# Patient Record
Sex: FEMALE | Race: WHITE | NOT HISPANIC OR LATINO
[De-identification: names, ages, dates, MRNs, and addresses within clinical notes are randomized per-mention and may not be internally consistent; named-entity substitution may affect disease eponyms.]

---

## 2022-01-05 ENCOUNTER — APPOINTMENT (OUTPATIENT)
Dept: PULMONOLOGY | Facility: CLINIC | Age: 47
End: 2022-01-05
Payer: MEDICAID

## 2022-01-05 VITALS — HEIGHT: 64 IN | WEIGHT: 225 LBS | BODY MASS INDEX: 38.41 KG/M2

## 2022-01-05 DIAGNOSIS — Z78.9 OTHER SPECIFIED HEALTH STATUS: ICD-10-CM

## 2022-01-05 DIAGNOSIS — R06.83 SNORING: ICD-10-CM

## 2022-01-05 PROCEDURE — 99203 OFFICE O/P NEW LOW 30 MIN: CPT | Mod: 95

## 2022-01-05 NOTE — HISTORY OF PRESENT ILLNESS
[FreeTextEntry1] : Kamaljit Rosen\par Brianna Estevez\par 46 year old woman  with history of obesity is here in the sleep center to address restless sleep. Patient is being evaluated for bariatric surgery.  Patient is sleepy with [place] sleepiness score of 12.  Patient has snoring as per daughter, does not know if there are any witnessed apneas, has choking sensations.  Patient's bedtime is around 11 PM wakes up in the morning around 6.30 AM.  She feels tired when she wakes up.  Patient drinks 1 cup of coffee during the daytime. patient also has headaches. There is no history of nocturia. She is not sleepy while driving.\par STOPBANG score - 3\par

## 2022-01-05 NOTE — ASSESSMENT
[FreeTextEntry1] : 46-year-old female with history of restless sleep and daytime tiredness will rule out sleep apnea.\par \par We'll do a home study, if home study is negative then will need in lab sleep study.

## 2022-01-05 NOTE — REASON FOR VISIT
[Home] : at home, [unfilled] , at the time of the visit. [Medical Office: ([facility])___] : at the medical office located in  [Verbal consent obtained from patient] : the patient, [unfilled] [Initial Evaluation] : an initial evaluation [Sleep Apnea] : sleep apnea

## 2022-01-24 ENCOUNTER — APPOINTMENT (OUTPATIENT)
Dept: BARIATRICS/WEIGHT MGMT | Facility: CLINIC | Age: 47
End: 2022-01-24
Payer: MEDICAID

## 2022-01-24 VITALS — WEIGHT: 225 LBS | HEIGHT: 64 IN | BODY MASS INDEX: 38.41 KG/M2

## 2022-01-24 PROCEDURE — 99205 OFFICE O/P NEW HI 60 MIN: CPT | Mod: 95

## 2022-01-24 RX ORDER — SUMATRIPTAN 50 MG/1
50 TABLET, FILM COATED ORAL
Qty: 9 | Refills: 0 | Status: ACTIVE | COMMUNITY
Start: 2021-12-16

## 2022-01-24 NOTE — CONSULT LETTER
[Dear  ___] : Dear  [unfilled], [( Thank you for referring [unfilled] for consultation for _____ )] : Thank you for referring [unfilled] for consultation for [unfilled] [Please see my note below.] : Please see my note below. [Consult Closing:] : Thank you very much for allowing me to participate in the care of this patient.  If you have any questions, please do not hesitate to contact me. [Sincerely,] : Sincerely, [FreeTextEntry3] : Renetta Pappas, NP

## 2022-01-24 NOTE — HISTORY OF PRESENT ILLNESS
[Home] : at home, [unfilled] , at the time of the visit. [Other Location: e.g. Home (Enter Location, City,State)___] : at [unfilled] [Verbal consent obtained from patient] : the patient, [unfilled] [FreeTextEntry1] : 45 y/o Female referred for medical weight loss consultation by Dr. Dean\par Saw Dr. Dean for possible bariatric surgery, at this time does not qualify for the procedure\par Would like to look into options for medical treatment of obesity\par Medical Hx: Obesity, Headaches, Fatigue\par Current weight 225 lbs, lowest adult weight 220 lbs\par Food Recall: B- yogurt, BEC on eng muffin, L-lean cuisine meal, D- steak, mashed potatoes and brocolli\par Snacks on tuna with crackers or pretzels\par Water intake adequate 2-3 large bottles of water + diet snapple + 1 cup of coffee in the morning\par Stress level high- "takes it one day at a time", single parent to 2 children, works FT at [employer]\par Sleep- inadequate, had a sleep study done recently, awaiting results, snores and wakes up often throughout the night, feels exhausted throughout the day\par \par \par

## 2022-01-24 NOTE — ASSESSMENT
[FreeTextEntry1] : Dietary Modification Education provided. Recommend a diet high in vegetables intake & lean protein. Recommend eating complex carbs instead of simple carbs\par Continue Tracking dietary intake daily \par Physical Activity- recommend aerobic exercise 3-4 times per week for 30-45 mins, recommend incorporating strength training 3 times per week. Try walking or taking the stairs during your lunch break at work.\par Labs- ordered fasting labs\par Medication- pt meets criteria to initiate medication treatment of obesity. Instructed pt to reach out to insurance company to see if saxenda, wegovy or qsymia are covered. Tried contrave in the past- lost 4 lbs max-ineffective. If she does not have coverage would recommend topiramate off label, add phentermine once sleep is under control to make Qsymia OR may benefit from Metformin if she has insulin resistance or elevated glucose. \par RTO in 2-4 weeks for lab review\par \par

## 2022-02-14 ENCOUNTER — APPOINTMENT (OUTPATIENT)
Dept: BARIATRICS/WEIGHT MGMT | Facility: CLINIC | Age: 47
End: 2022-02-14
Payer: MEDICAID

## 2022-02-14 VITALS — WEIGHT: 225 LBS | HEIGHT: 64 IN | BODY MASS INDEX: 38.41 KG/M2

## 2022-02-14 DIAGNOSIS — Z00.00 ENCOUNTER FOR GENERAL ADULT MEDICAL EXAMINATION W/OUT ABNORMAL FINDINGS: ICD-10-CM

## 2022-02-14 PROCEDURE — 99215 OFFICE O/P EST HI 40 MIN: CPT | Mod: 95

## 2022-02-14 NOTE — ASSESSMENT
[FreeTextEntry1] : Recommend dietary modification. Reduce intake of bad fat including-fried foods, full fat dairy products, red meat, meat with skin on it, etc. Recommend eating foods with healthy fat including avocado, fish, omega-3 fatty acids, nuts, lean protein. Recommend avoiding simple sugars and instead eating complex carbohydrates/ whole grains. Recommend increasing your physical activity & Tracking dietary intake daily- considering Noom. \par Physical Activity- recommend aerobic exercise 3-4 times per week for 30-45 mins, recommend incorporating strength training 3 times per week. Try walking or taking the stairs during your lunch break at work.\par Labs- reviewed, recheck in 3 months\par Medication- pt meets criteria to initiate medication treatment of obesity. Tried contrave in the past- lost 4 lbs max-ineffective. \par Recommend Metformin for blood sugar regulation and appetite suppression. \par Plan to initiate treatment with Metformin 500 mg tablets. Discussed common side effects including: n/v/d/c. Discussed titration schedule with patient. Increase dose slowly to minimize GI side effects. Increase dose by 500 mg increments every 7 days.\par Scheduled f/u apt on 3/14 at 6:30pm

## 2022-02-14 NOTE — HISTORY OF PRESENT ILLNESS
[FreeTextEntry1] : 45 y/o Female referred for medical weight loss consultation by Dr. Dean\par Saw Dr. Dean for possible bariatric surgery, at this time does not qualify for the procedure\par Would like to look into options for medical treatment of obesity\par Medical Hx: Obesity, Headaches, Fatigue\par Current weight 225 lbs, lowest adult weight 220 lbs\par Tried contrave in the past- felt it was ineffective, lost 4 lbs total and discontinued before reaching full dose \par Food Recall: B- yogurt, BEC on eng muffin, L-lean cuisine meal, D- steak, mashed potatoes and broccoli\par Snacks on tuna with crackers or pretzels\par Water intake adequate 2-3 large bottles of water + diet snapple + 1 cup of coffee in the morning\par Stress level high- "takes it one day at a time", single parent to 2 children, works FT at [employer]\par Sleep- inadequate, had a sleep study done recently, awaiting results, snores and wakes up often throughout the night, feels exhausted throughout the day\par GYN- on birth control for ovarian cysts\par \par 2/14/22: Lab results reviewed with pt- elevated cholesterol, elevated LDL, elevated CRP, TSH WNL, HgBA1c- 6.1%, av glucose elevated, fasting insulin elevated, ALT elevated. Recieved sleep study results, which showed mild SHAHRAM, denies need for CPAP/treatment. Has changed dietary intake of simple carbs and changed over to complex carbs including- ww bread/rolls. Eating yogurt daily, increasing vegetable intake. Water intake good. \par \par \par

## 2022-03-14 ENCOUNTER — APPOINTMENT (OUTPATIENT)
Dept: BARIATRICS/WEIGHT MGMT | Facility: CLINIC | Age: 47
End: 2022-03-14
Payer: MEDICAID

## 2022-03-14 PROCEDURE — 99215 OFFICE O/P EST HI 40 MIN: CPT | Mod: 95

## 2022-03-14 NOTE — HISTORY OF PRESENT ILLNESS
[Home] : at home, [unfilled] , at the time of the visit. [Other Location: e.g. Home (Enter Location, City,State)___] : at [unfilled] [Verbal consent obtained from patient] : the patient, [unfilled] [FreeTextEntry1] : 45 y/o Female referred for medical weight loss consultation by Dr. Dean\par Saw Dr. Dean for possible bariatric surgery, at this time does not qualify for the procedure\par Would like to look into options for medical treatment of obesity\par Medical Hx: Obesity, Headaches, Fatigue\par Current weight 225 lbs, lowest adult weight 220 lbs\par Food Recall: B- yogurt, BEC on eng muffin, L-lean cuisine meal, D- steak, mashed potatoes and brocolli\par Snacks on tuna with crackers or pretzels\par Water intake adequate 2-3 large bottles of water + diet snapple + 1 cup of coffee in the morning\par Stress level high- "takes it one day at a time", single parent to 2 children, works FT at [employer]\par Sleep- inadequate, had a sleep study done recently, awaiting results, snores and wakes up often throughout the night, feels exhausted throughout the day\par \par 2/14/22: Lab results reviewed with pt- elevated cholesterol, elevated LDL, elevated CRP, TSH WNL, HgAb1c 6.1%, av glucose elevated, fasting insulin elevated, ALT elevated. Sleep study showed mild sleep apnea. \par \par 3/14/22: Maintained weight, tolerating metformin 2,000 mg daily. Frustrated that she isn't losing weight. Increased fruit intake. Food recall: B- yogurt with banana, L- lean cuisine, dinner- lean protein with vegetables. Joined [facility]- went once with her children so far- did 30-40 mins of cardio exercise on the indoor track and treadmill. Struggling with increased stress r/t financial stressor and hx of PTSD. Open to therapy to help with stress at this time. \par

## 2022-03-14 NOTE — ASSESSMENT
[FreeTextEntry1] : Recommend meal prep if able- given examples of ways to have healthy meals options more accessible in the fridge or freezer. \par Physical Activity- recommend aerobic exercise 3-4 times per week for 30-45 mins, recommend incorporating strength training 3 times per week. Exercise goal- use the [facility] gym at least once per month. Will increase to 2 times/ month if they have extended hours throughout the work week. \par Labs- recheck in 2 months\par Medication- pt meets criteria to initiate medication treatment of obesity. Tried contrave in the past- lost 4 lbs max-ineffective. Continue to take 2000 mg of metformin daily for insulin resistance. \par Mental Health- given information for [employer] [facility] [employer] and Marilyn De Jesus, psychotherapist\par Scheduled f/u apt on 4/18 at 6:30pm

## 2022-04-18 ENCOUNTER — APPOINTMENT (OUTPATIENT)
Dept: BARIATRICS/WEIGHT MGMT | Facility: CLINIC | Age: 47
End: 2022-04-18
Payer: MEDICAID

## 2022-04-18 VITALS — BODY MASS INDEX: 38.41 KG/M2 | WEIGHT: 225 LBS | HEIGHT: 64 IN

## 2022-04-18 PROCEDURE — 99215 OFFICE O/P EST HI 40 MIN: CPT | Mod: 95

## 2022-04-18 RX ORDER — NORETHINDRONE ACETATE AND ETHINYL ESTRADIOL AND FERROUS FUMARATE 1MG-20(21)
1-20 KIT ORAL
Qty: 84 | Refills: 0 | Status: ACTIVE | COMMUNITY
Start: 2022-03-24

## 2022-04-18 RX ORDER — DEXAMETHASONE 1 MG/1
1 TABLET ORAL
Qty: 1 | Refills: 0 | Status: DISCONTINUED | COMMUNITY
Start: 2022-01-24 | End: 2022-04-18

## 2022-04-21 RX ORDER — PHENTERMINE HYDROCHLORIDE 8 MG/1
8 TABLET ORAL DAILY
Qty: 30 | Refills: 0 | Status: DISCONTINUED | COMMUNITY
Start: 2022-04-18 | End: 2022-04-21

## 2022-04-21 RX ORDER — METFORMIN ER 500 MG 500 MG/1
500 TABLET ORAL
Qty: 120 | Refills: 1 | Status: DISCONTINUED | COMMUNITY
Start: 2022-02-15 | End: 2022-04-21

## 2022-05-16 ENCOUNTER — RX RENEWAL (OUTPATIENT)
Age: 47
End: 2022-05-16

## 2022-05-23 ENCOUNTER — APPOINTMENT (OUTPATIENT)
Dept: BARIATRICS/WEIGHT MGMT | Facility: CLINIC | Age: 47
End: 2022-05-23
Payer: MEDICAID

## 2022-05-23 VITALS — HEIGHT: 64 IN | WEIGHT: 223 LBS | BODY MASS INDEX: 38.07 KG/M2

## 2022-05-23 PROCEDURE — 99215 OFFICE O/P EST HI 40 MIN: CPT | Mod: 95

## 2022-05-23 RX ORDER — PHENTERMINE HYDROCHLORIDE 8 MG/1
8 TABLET ORAL DAILY
Qty: 30 | Refills: 0 | Status: DISCONTINUED | COMMUNITY
Start: 2022-04-21 | End: 2022-05-23

## 2022-05-23 RX ORDER — PHENTERMINE HYDROCHLORIDE 8 MG/1
8 TABLET ORAL
Qty: 30 | Refills: 0 | Status: DISCONTINUED | COMMUNITY
Start: 2022-04-28 | End: 2022-05-23

## 2022-05-23 NOTE — ASSESSMENT
[FreeTextEntry1] : Continue to focus diet on eating whole foods- lean protein, vegetables, and whole grains\par Discussed the importance of avoiding simple sugars and increasing dietary fiber in foods\par Continue to exercise 2-3 times per week \par Medication- plan to increase dose of phentermine to 15 mg daily to help with cravings and dec hunger. Continue topiramate 25 mg QHS as this helps with sleep pattern. Continue Metformin, can reduce dose to 1,500mg daily. \par Elevated WBC & CRP- instructed to see PCP to discuss further- plans on seeing Dr. Peoples at [facility] \par RTO in 1 month- 6/20 at 6:30pm

## 2022-05-23 NOTE — HISTORY OF PRESENT ILLNESS
[Home] : at home, [unfilled] , at the time of the visit. [Other Location: e.g. Home (Enter Location, City,State)___] : at [unfilled] [Verbal consent obtained from patient] : the patient, [unfilled] [FreeTextEntry1] : 45 y/o Female referred for medical weight loss consultation by Dr. Dean\par Saw Dr. Dean for possible bariatric surgery, at this time does not qualify for the procedure\par Would like to look into options for medical treatment of obesity\par Medical Hx: Obesity, Headaches, Fatigue\par Current weight 225 lbs, lowest adult weight 220 lbs\par Food Recall: B- yogurt, BEC on eng muffin, L-lean cuisine meal, D- steak, mashed potatoes and brocolli\par Snacks on tuna with crackers or pretzels\par Water intake adequate 2-3 large bottles of water + diet snapple + 1 cup of coffee in the morning\par Stress level high- "takes it one day at a time", single parent to 2 children, works FT at [employer]\par Sleep- inadequate, had a sleep study done recently, awaiting results, snores and wakes up often throughout the night, feels exhausted throughout the day\par \par 2/14/22: Lab results reviewed with pt- elevated cholesterol, elevated LDL, elevated CRP, TSH WNL, HgAb1c 6.1%, av glucose elevated, fasting insulin elevated, ALT elevated. Sleep study showed mild sleep apnea. \par \par 3/14/22: Maintained weight, tolerating metformin 2,000 mg daily. Frustrated that she isn't losing weight. Increased fruit intake. Food recall: B- yogurt with banana, L- lean cuisine, dinner- lean protein with vegetables. Joined [facility]- went once with her children so far- did 30-40 mins of cardio exercise on the indoor track and treadmill. Struggling with increased stress r/t financial stressor and hx of PTSD. Open to therapy to help with stress at this time. \par \par 4/18/22: Maintained wt, continues to take metformin 2,000 mg daily. Frustrated that she hasn't lost wt. Reviewed recent lab results, HgBA1c increased slightly to 6.2%, Elevated CRP, Elevated WBC, cholesterol levels improved to normal levels. Trying to focus on eating better and less inflammatory diet- more vegetables, fruits, and less sugar. Water intake- adequate, drinks crystal light as well. Continues to struggle with stress. Would like to try another medication for weight loss- does not have coverage for branded weight loss medication. \par \par 5/23/22: Lost 2 lbs, taking metformin 2,000 md daily, topiramate 25 mg QHS, and Lomaira 8 mg daily. Reports sleeping better but feels hot at nighttime, + loose stools and urgency after morning dose of Metformin 1000 mg. Continues to focus on eating better, cut out artificial sweeteners. Water intake improving. Reviewed recent lab results- WBC and CRP still elevated but better. Eating- salads with grilled chicken with ranch dressing, turkey sandwich on WW, etc. Exercising 2 x per week at the [facility]- walking track/ treadmill.

## 2022-06-20 ENCOUNTER — APPOINTMENT (OUTPATIENT)
Dept: BARIATRICS/WEIGHT MGMT | Facility: CLINIC | Age: 47
End: 2022-06-20
Payer: MEDICAID

## 2022-06-20 VITALS — HEIGHT: 64 IN | WEIGHT: 217 LBS | BODY MASS INDEX: 37.05 KG/M2

## 2022-06-20 PROCEDURE — 99214 OFFICE O/P EST MOD 30 MIN: CPT | Mod: 95

## 2022-06-20 NOTE — HISTORY OF PRESENT ILLNESS
[FreeTextEntry1] : 45 y/o Female referred for medical weight loss consultation by Dr. Dean\par Saw Dr. Dean for possible bariatric surgery, at this time does not qualify for the procedure\par Would like to look into options for medical treatment of obesity\par Medical Hx: Obesity, Headaches, Fatigue\par Current weight 225 lbs, lowest adult weight 220 lbs\par Food Recall: B- yogurt, BEC on eng muffin, L-lean cuisine meal, D- steak, mashed potatoes and brocolli\par Snacks on tuna with crackers or pretzels\par Water intake adequate 2-3 large bottles of water + diet snapple + 1 cup of coffee in the morning\par Stress level high- "takes it one day at a time", single parent to 2 children, works FT at [employer]\par Sleep- inadequate, had a sleep study done recently, awaiting results, snores and wakes up often throughout the night, feels exhausted throughout the day\par \par 2/14/22: Lab results reviewed with pt- elevated cholesterol, elevated LDL, elevated CRP, TSH WNL, HgAb1c 6.1%, av glucose elevated, fasting insulin elevated, ALT elevated. Sleep study showed mild sleep apnea. \par \par 3/14/22: Maintained weight, tolerating metformin 2,000 mg daily. Frustrated that she isn't losing weight. Increased fruit intake. Food recall: B- yogurt with banana, L- lean cuisine, dinner- lean protein with vegetables. Joined [facility]- went once with her children so far- did 30-40 mins of cardio exercise on the indoor track and treadmill. Struggling with increased stress r/t financial stressor and hx of PTSD. Open to therapy to help with stress at this time. \par \par 4/18/22: Maintained wt, continues to take metformin 2,000 mg daily. Frustrated that she hasn't lost wt. Reviewed recent lab results, HgBA1c increased slightly to 6.2%, Elevated CRP, Elevated WBC, cholesterol levels improved to normal levels. Trying to focus on eating better and less inflammatory diet- more vegetables, fruits, and less sugar. Water intake- adequate, drinks crystal light as well. Continues to struggle with stress. Would like to try another medication for weight loss- does not have coverage for branded weight loss medication. \par \par 5/23/22: Lost 2 lbs, taking metformin 2,000 md daily, topiramate 25 mg QHS, and Lomaira 8 mg daily. Reports sleeping better but feels hot at nighttime, + loose stools and urgency after morning dose of Metformin 1000 mg. Continues to focus on eating better, cut out artificial sweeteners. Water intake improving. Reviewed recent lab results- WBC and CRP still elevated but better. Eating- salads with grilled chicken with ranch dressing, turkey sandwich on WW, etc. Exercising 2 x per week at the [facility]- walking track/ treadmill. \par \par 6/20/22: Lost 6 lbs, 8 lbs total. Tolerating metformin 2,000 mg daily, topiramate 25 mg QHS, and Phentermine 15 mg daily. Feels motivated now that she has seen some progress with weight loss. Making better food choices and continues to go to the [facility] to exercise as able. Saw PCP for elevated WBC & CRP.

## 2022-06-20 NOTE — ASSESSMENT
[FreeTextEntry1] : Continue lifestyle modification, try to increase exercise routine to 3 times per week \par Medications- continue current doses/ regimen. \par Will recheck labs before next session to check for health progression with weight loss\par RTO in 1 month- 7/25 at 10 am

## 2022-07-25 ENCOUNTER — APPOINTMENT (OUTPATIENT)
Dept: BARIATRICS/WEIGHT MGMT | Facility: CLINIC | Age: 47
End: 2022-07-25

## 2022-07-25 VITALS — BODY MASS INDEX: 36.37 KG/M2 | HEIGHT: 64 IN | WEIGHT: 213 LBS

## 2022-07-25 PROCEDURE — 99214 OFFICE O/P EST MOD 30 MIN: CPT | Mod: 95

## 2022-07-25 NOTE — HISTORY OF PRESENT ILLNESS
[Home] : at home, [unfilled] , at the time of the visit. [Other Location: e.g. Home (Enter Location, City,State)___] : at [unfilled] [Verbal consent obtained from patient] : the patient, [unfilled] [FreeTextEntry1] : 47 y/o Female referred for medical weight loss consultation by Dr. Dean\par Saw Dr. Dean for possible bariatric surgery, at this time does not qualify for the procedure\par Would like to look into options for medical treatment of obesity\par Medical Hx: Obesity, Headaches, Fatigue\par Current weight 225 lbs, lowest adult weight 220 lbs\par Food Recall: B- yogurt, BEC on eng muffin, L-lean cuisine meal, D- steak, mashed potatoes and brocolli\par Snacks on tuna with crackers or pretzels\par Water intake adequate 2-3 large bottles of water + diet snapple + 1 cup of coffee in the morning\par Stress level high- "takes it one day at a time", single parent to 2 children, works FT at [employer]\par Sleep- inadequate, had a sleep study done recently, awaiting results, snores and wakes up often throughout the night, feels exhausted throughout the day\par \par 2/14/22: Lab results reviewed with pt- elevated cholesterol, elevated LDL, elevated CRP, TSH WNL, HgAb1c 6.1%, av glucose elevated, fasting insulin elevated, ALT elevated. Sleep study showed mild sleep apnea. \par \par 3/14/22: Maintained weight, tolerating metformin 2,000 mg daily. Frustrated that she isn't losing weight. Increased fruit intake. Food recall: B- yogurt with banana, L- lean cuisine, dinner- lean protein with vegetables. Joined [facility]- went once with her children so far- did 30-40 mins of cardio exercise on the indoor track and treadmill. Struggling with increased stress r/t financial stressor and hx of PTSD. Open to therapy to help with stress at this time. \par \par 4/18/22: Maintained wt, continues to take metformin 2,000 mg daily. Frustrated that she hasn't lost wt. Reviewed recent lab results, HgBA1c increased slightly to 6.2%, Elevated CRP, Elevated WBC, cholesterol levels improved to normal levels. Trying to focus on eating better and less inflammatory diet- more vegetables, fruits, and less sugar. Water intake- adequate, drinks crystal light as well. Continues to struggle with stress. Would like to try another medication for weight loss- does not have coverage for branded weight loss medication. \par \par 5/23/22: Lost 2 lbs, taking metformin 2,000 md daily, topiramate 25 mg QHS, and Lomaira 8 mg daily. Reports sleeping better but feels hot at nighttime, + loose stools and urgency after morning dose of Metformin 1000 mg. Continues to focus on eating better, cut out artificial sweeteners. Water intake improving. Reviewed recent lab results- WBC and CRP still elevated but better. Eating- salads with grilled chicken with ranch dressing, turkey sandwich on WW, etc. Exercising 2 x per week at the [facility]- walking track/ treadmill. \par \par 6/20/22: Lost 6 lbs, 8 lbs total. Tolerating metformin 2,000 mg daily, topiramate 25 mg QHS, and Phentermine 15 mg daily. Feels motivated now that she has seen some progress with weight loss. Making better food choices and continues to go to the [facility] to exercise as able. Saw PCP for elevated WBC & CRP. \par \par 7/25/22: Lost 4 lbs, 12 lbs total. Tolerating medication, reviewed lab result (CRP elevated but improved, HgBA1c down to 5.9%, Insulin level normal, WBC unchanged). Able to eat smaller portions and reports good appetite suppression. Sleep inadequate.

## 2022-07-25 NOTE — ASSESSMENT
[FreeTextEntry1] : Celebrated wt loss and improvement in lab levels\par Continue dietary changes and increase activity, continue to go to the gym for exercise\par Med- RX refill sent for topirimate (inc to 50 mg daily), and metfromin 2,000 mg daily\par RTO in 1- 2 months (would like to meet in-person)

## 2022-08-08 ENCOUNTER — RX RENEWAL (OUTPATIENT)
Age: 47
End: 2022-08-08

## 2022-08-11 ENCOUNTER — RX RENEWAL (OUTPATIENT)
Age: 47
End: 2022-08-11

## 2022-09-06 ENCOUNTER — RX RENEWAL (OUTPATIENT)
Age: 47
End: 2022-09-06

## 2022-09-19 ENCOUNTER — RX RENEWAL (OUTPATIENT)
Age: 47
End: 2022-09-19

## 2022-09-20 ENCOUNTER — RX RENEWAL (OUTPATIENT)
Age: 47
End: 2022-09-20

## 2022-09-22 ENCOUNTER — APPOINTMENT (OUTPATIENT)
Dept: BARIATRICS/WEIGHT MGMT | Facility: CLINIC | Age: 47
End: 2022-09-22

## 2022-09-22 VITALS — WEIGHT: 206.5 LBS | HEIGHT: 64 IN | BODY MASS INDEX: 35.26 KG/M2

## 2022-09-22 PROCEDURE — 99215 OFFICE O/P EST HI 40 MIN: CPT

## 2022-09-22 NOTE — HISTORY OF PRESENT ILLNESS
[FreeTextEntry1] : 47 y/o Female referred for medical weight loss consultation by Dr. Dean\par Saw Dr. Dean for possible bariatric surgery, at this time does not qualify for the procedure\par Would like to look into options for medical treatment of obesity\par Medical Hx: Obesity, Headaches, Fatigue\par Current weight 225 lbs, lowest adult weight 220 lbs\par Food Recall: B- yogurt, BEC on eng muffin, L-lean cuisine meal, D- steak, mashed potatoes and brocolli\par Snacks on tuna with crackers or pretzels\par Water intake adequate 2-3 large bottles of water + diet snapple + 1 cup of coffee in the morning\par Stress level high- "takes it one day at a time", single parent to 2 children, works FT at [employer]\par Sleep- inadequate, had a sleep study done recently, awaiting results, snores and wakes up often throughout the night, feels exhausted throughout the day\par \par 2/14/22: Lab results reviewed with pt- elevated cholesterol, elevated LDL, elevated CRP, TSH WNL, HgAb1c 6.1%, av glucose elevated, fasting insulin elevated, ALT elevated. Sleep study showed mild sleep apnea. \par \par 3/14/22: Maintained weight, tolerating metformin 2,000 mg daily. Frustrated that she isn't losing weight. Increased fruit intake. Food recall: B- yogurt with banana, L- lean cuisine, dinner- lean protein with vegetables. Joined [facility]- went once with her children so far- did 30-40 mins of cardio exercise on the indoor track and treadmill. Struggling with increased stress r/t financial stressor and hx of PTSD. Open to therapy to help with stress at this time. \par \par 4/18/22: Maintained wt, continues to take metformin 2,000 mg daily. Frustrated that she hasn't lost wt. Reviewed recent lab results, HgBA1c increased slightly to 6.2%, Elevated CRP, Elevated WBC, cholesterol levels improved to normal levels. Trying to focus on eating better and less inflammatory diet- more vegetables, fruits, and less sugar. Water intake- adequate, drinks crystal light as well. Continues to struggle with stress. Would like to try another medication for weight loss- does not have coverage for branded weight loss medication. \par \par 5/23/22: Lost 2 lbs, taking metformin 2,000 md daily, topiramate 25 mg QHS, and Lomaira 8 mg daily. Reports sleeping better but feels hot at nighttime, + loose stools and urgency after morning dose of Metformin 1000 mg. Continues to focus on eating better, cut out artificial sweeteners. Water intake improving. Reviewed recent lab results- WBC and CRP still elevated but better. Eating- salads with grilled chicken with ranch dressing, turkey sandwich on WW, etc. Exercising 2 x per week at the [facility]- walking track/ treadmill. \par \par 6/20/22: Lost 6 lbs, 8 lbs total. Tolerating metformin 2,000 mg daily, topiramate 25 mg QHS, and Phentermine 15 mg daily. Feels motivated now that she has seen some progress with weight loss. Making better food choices and continues to go to the [facility] to exercise as able. Saw PCP for elevated WBC & CRP. \par \par 7/25/22: Lost 4 lbs, 12 lbs total. Tolerating medication, reviewed lab result (CRP elevated but improved, HgBA1c down to 5.9%, Insulin level normal, WBC unchanged). Able to eat smaller portions and reports good appetite suppression. Sleep inadequate. \par \par 9/22/22: Lost 7 lbs, down 19 lbs total. Tolerating medication and reports decrease in appetite. Has stopped snacking and eats smaller portions. Sleep inadequate and struggling with high stress level r/t custody lundy with ex- for her daughter. Starting family therapy with her daughter next week. Eating a healthy diet overall.

## 2022-09-22 NOTE — ASSESSMENT
[FreeTextEntry1] : Celebrated her weight loss success so far and healthier lifestyle\par Encouraged to continue eating healthier \par Encouraged to exercise when able- suggested taking kids to a park and walking with a friend for emotional support\par Mental Health- discussed the importance of decreasing stress level and working on coping strategies. Rec she seek therapy for herself to help work through current struggles in addition to family therapy\par Medication- can continue to take current medication regimen daily (Metformin 2706-4012 mg daily, Phentermine 15 mg daily, & Topiramate 50 mg daily)\par RTO in 1-2 months

## 2022-10-17 ENCOUNTER — RX RENEWAL (OUTPATIENT)
Age: 47
End: 2022-10-17

## 2022-11-16 ENCOUNTER — APPOINTMENT (OUTPATIENT)
Dept: BARIATRICS/WEIGHT MGMT | Facility: CLINIC | Age: 47
End: 2022-11-16

## 2022-11-16 VITALS — BODY MASS INDEX: 34.31 KG/M2 | WEIGHT: 201 LBS | HEIGHT: 64 IN

## 2022-11-16 DIAGNOSIS — E66.9 OBESITY, UNSPECIFIED: ICD-10-CM

## 2022-11-16 PROCEDURE — 99213 OFFICE O/P EST LOW 20 MIN: CPT | Mod: 95

## 2022-11-16 NOTE — HISTORY OF PRESENT ILLNESS
[Other Location: e.g. School (Enter Location, City,State)___] : at [unfilled], at the time of the visit. [Other Location: e.g. Home (Enter Location, City,State)___] : at [unfilled] [Verbal consent obtained from patient] : the patient, [unfilled] [FreeTextEntry1] : 45 y/o Female referred for medical weight loss consultation by Dr. Dean\par Saw Dr. Dean for possible bariatric surgery, at this time does not qualify for the procedure\par Would like to look into options for medical treatment of obesity\par Medical Hx: Obesity, Headaches, Fatigue\par Current weight 225 lbs, lowest adult weight 220 lbs\par Food Recall: B- yogurt, BEC on eng muffin, L-lean cuisine meal, D- steak, mashed potatoes and brocolli\par Snacks on tuna with crackers or pretzels\par Water intake adequate 2-3 large bottles of water + diet snapple + 1 cup of coffee in the morning\par Stress level high- "takes it one day at a time", single parent to 2 children, works FT at [employer]\par Sleep- inadequate, had a sleep study done recently, awaiting results, snores and wakes up often throughout the night, feels exhausted throughout the day\par \par 2/14/22: Lab results reviewed with pt- elevated cholesterol, elevated LDL, elevated CRP, TSH WNL, HgAb1c 6.1%, av glucose elevated, fasting insulin elevated, ALT elevated. Sleep study showed mild sleep apnea. \par \par 3/14/22: Maintained weight, tolerating metformin 2,000 mg daily. Frustrated that she isn't losing weight. Increased fruit intake. Food recall: B- yogurt with banana, L- lean cuisine, dinner- lean protein with vegetables. Joined [facility]- went once with her children so far- did 30-40 mins of cardio exercise on the indoor track and treadmill. Struggling with increased stress r/t financial stressor and hx of PTSD. Open to therapy to help with stress at this time. \par \par 4/18/22: Maintained wt, continues to take metformin 2,000 mg daily. Frustrated that she hasn't lost wt. Reviewed recent lab results, HgBA1c increased slightly to 6.2%, Elevated CRP, Elevated WBC, cholesterol levels improved to normal levels. Trying to focus on eating better and less inflammatory diet- more vegetables, fruits, and less sugar. Water intake- adequate, drinks crystal light as well. Continues to struggle with stress. Would like to try another medication for weight loss- does not have coverage for branded weight loss medication. \par \par 5/23/22: Lost 2 lbs, taking metformin 2,000 md daily, topiramate 25 mg QHS, and Lomaira 8 mg daily. Reports sleeping better but feels hot at nighttime, + loose stools and urgency after morning dose of Metformin 1000 mg. Continues to focus on eating better, cut out artificial sweeteners. Water intake improving. Reviewed recent lab results- WBC and CRP still elevated but better. Eating- salads with grilled chicken with ranch dressing, turkey sandwich on WW, etc. Exercising 2 x per week at the [facility]- walking track/ treadmill. \par \par 6/20/22: Lost 6 lbs, 8 lbs total. Tolerating metformin 2,000 mg daily, topiramate 25 mg QHS, and Phentermine 15 mg daily. Feels motivated now that she has seen some progress with weight loss. Making better food choices and continues to go to the [facility] to exercise as able. Saw PCP for elevated WBC & CRP. \par \par 7/25/22: Lost 4 lbs, 12 lbs total. Tolerating medication, reviewed lab result (CRP elevated but improved, HgBA1c down to 5.9%, Insulin level normal, WBC unchanged). Able to eat smaller portions and reports good appetite suppression. Sleep inadequate. \par \par 9/22/22: Lost 7 lbs, down 19 lbs total. Tolerating medication and reports decrease in appetite. Has stopped snacking and eats smaller portions. Sleep inadequate and struggling with high stress level r/t custody lundy with ex- for her daughter. Starting family therapy with her daughter next week. Eating a healthy diet overall.\par \par 11/16/22: Lost 5 lbs, down 24 lbs total. Tolerating medication regimen and wants to continue at this point. Continues to struggle with high stress level related to custody lundy over her daughter.Sleep inadequate due to stress. Continues to go to family therapy with her daughter. Denies finding therapist for herself.

## 2022-11-16 NOTE — ASSESSMENT
[FreeTextEntry1] : Dietary- continue to focus on eating lean protein, vegetables and whole grains. \par Physical Activity- recommend aerobic exercise 3-4 times per week for 30-45 mins, recommend incorporating strength training 3 times per week. \par Medication- continue current regimen of phentermine, topiramate, and metfromin daily. Will increase topiramate dose \par Mental Health- recommend that she reach out to Employee Assistance Program for help with mental health support \par Scheduled f/u apt in 1 week

## 2022-11-18 ENCOUNTER — RX RENEWAL (OUTPATIENT)
Age: 47
End: 2022-11-18

## 2022-11-21 ENCOUNTER — APPOINTMENT (OUTPATIENT)
Dept: BARIATRICS/WEIGHT MGMT | Facility: CLINIC | Age: 47
End: 2022-11-21

## 2022-11-21 PROCEDURE — 99212 OFFICE O/P EST SF 10 MIN: CPT | Mod: 95

## 2022-11-21 NOTE — HISTORY OF PRESENT ILLNESS
[Other Location: e.g. School (Enter Location, City,State)___] : at [unfilled], at the time of the visit. [Other Location: e.g. Home (Enter Location, City,State)___] : at [unfilled] [Verbal consent obtained from patient] : the patient, [unfilled] [FreeTextEntry1] : 47 y/o Female referred for medical weight loss consultation by Dr. Dean\par Saw Dr. Dean for possible bariatric surgery, at this time does not qualify for the procedure\par Would like to look into options for medical treatment of obesity\par Medical Hx: Obesity, Headaches, Fatigue\par Current weight 225 lbs, lowest adult weight 220 lbs\par Food Recall: B- yogurt, BEC on eng muffin, L-lean cuisine meal, D- steak, mashed potatoes and brocolli\par Snacks on tuna with crackers or pretzels\par Water intake adequate 2-3 large bottles of water + diet snapple + 1 cup of coffee in the morning\par Stress level high- "takes it one day at a time", single parent to 2 children, works FT at [employer]\par Sleep- inadequate, had a sleep study done recently, awaiting results, snores and wakes up often throughout the night, feels exhausted throughout the day\par \par 2/14/22: Lab results reviewed with pt- elevated cholesterol, elevated LDL, elevated CRP, TSH WNL, HgAb1c 6.1%, av glucose elevated, fasting insulin elevated, ALT elevated. Sleep study showed mild sleep apnea. \par \par 3/14/22: Maintained weight, tolerating metformin 2,000 mg daily. Frustrated that she isn't losing weight. Increased fruit intake. Food recall: B- yogurt with banana, L- lean cuisine, dinner- lean protein with vegetables. Joined [facility]- went once with her children so far- did 30-40 mins of cardio exercise on the indoor track and treadmill. Struggling with increased stress r/t financial stressor and hx of PTSD. Open to therapy to help with stress at this time. \par \par 4/18/22: Maintained wt, continues to take metformin 2,000 mg daily. Frustrated that she hasn't lost wt. Reviewed recent lab results, HgBA1c increased slightly to 6.2%, Elevated CRP, Elevated WBC, cholesterol levels improved to normal levels. Trying to focus on eating better and less inflammatory diet- more vegetables, fruits, and less sugar. Water intake- adequate, drinks crystal light as well. Continues to struggle with stress. Would like to try another medication for weight loss- does not have coverage for branded weight loss medication. \par \par 5/23/22: Lost 2 lbs, taking metformin 2,000 md daily, topiramate 25 mg QHS, and Lomaira 8 mg daily. Reports sleeping better but feels hot at nighttime, + loose stools and urgency after morning dose of Metformin 1000 mg. Continues to focus on eating better, cut out artificial sweeteners. Water intake improving. Reviewed recent lab results- WBC and CRP still elevated but better. Eating- salads with grilled chicken with ranch dressing, turkey sandwich on WW, etc. Exercising 2 x per week at the [facility]- walking track/ treadmill. \par \par 6/20/22: Lost 6 lbs, 8 lbs total. Tolerating metformin 2,000 mg daily, topiramate 25 mg QHS, and Phentermine 15 mg daily. Feels motivated now that she has seen some progress with weight loss. Making better food choices and continues to go to the [facility] to exercise as able. Saw PCP for elevated WBC & CRP. \par \par 7/25/22: Lost 4 lbs, 12 lbs total. Tolerating medication, reviewed lab result (CRP elevated but improved, HgBA1c down to 5.9%, Insulin level normal, WBC unchanged). Able to eat smaller portions and reports good appetite suppression. Sleep inadequate. \par \par 9/22/22: Lost 7 lbs, down 19 lbs total. Tolerating medication and reports decrease in appetite. Has stopped snacking and eats smaller portions. Sleep inadequate and struggling with high stress level r/t custody lundy with ex- for her daughter. Starting family therapy with her daughter next week. Eating a healthy diet overall.\par \par 11/16/22: Lost 5 lbs, down 24 lbs total. Tolerating medication regimen and wants to continue at this point. Continues to struggle with high stress level related to custody lundy over her daughter.Sleep inadequate due to stress. Continues to go to family therapy with her daughter. Denies finding therapist for herself. \par \par 11/21/22: Able to get an appointment on 12/13/22 to start therapy through the employee assistance program at [facility]. Feels better overall today with her current situation. Reports getting better sleep since starting the higher dose of topiramate, denies side effects.

## 2022-11-21 NOTE — ASSESSMENT
[FreeTextEntry1] : Continue to focus on current lifestyle modifications \par Enc to eat healthy, increase activity level, and focus on mental health so she feels better overall\par Scheduled a f/u apt on 12/19 at 1

## 2022-12-19 ENCOUNTER — RX RENEWAL (OUTPATIENT)
Age: 47
End: 2022-12-19

## 2022-12-19 ENCOUNTER — APPOINTMENT (OUTPATIENT)
Dept: BARIATRICS/WEIGHT MGMT | Facility: CLINIC | Age: 47
End: 2022-12-19

## 2022-12-19 PROCEDURE — 99213 OFFICE O/P EST LOW 20 MIN: CPT | Mod: 95

## 2022-12-19 NOTE — HISTORY OF PRESENT ILLNESS
[Other Location: e.g. School (Enter Location, City,State)___] : at [unfilled], at the time of the visit. [Other Location: e.g. Home (Enter Location, City,State)___] : at [unfilled] [Verbal consent obtained from patient] : the patient, [unfilled] [FreeTextEntry1] : 47 y/o Female referred for medical weight loss consultation by Dr. Dean\par Saw Dr. Dean for possible bariatric surgery, at this time does not qualify for the procedure\par Would like to look into options for medical treatment of obesity\par Medical Hx: Obesity, Headaches, Fatigue\par Current weight 225 lbs, lowest adult weight 220 lbs\par Food Recall: B- yogurt, BEC on eng muffin, L-lean cuisine meal, D- steak, mashed potatoes and broccoli\par Snacks on tuna with crackers or pretzels\par Water intake adequate 2-3 large bottles of water + diet snapple + 1 cup of coffee in the morning\par Stress level high- "takes it one day at a time", single parent to 2 children, works FT at [employer]\par Sleep- inadequate, had a sleep study done recently, awaiting results, snores and wakes up often throughout the night, feels exhausted throughout the day\par \par 2/14/22: Lab results reviewed with pt- elevated cholesterol, elevated LDL, elevated CRP, TSH WNL, HgAb1c 6.1%, av glucose elevated, fasting insulin elevated, ALT elevated. Sleep study showed mild sleep apnea. \par \par 3/14/22: Maintained weight, tolerating metformin 2,000 mg daily. Frustrated that she isn't losing weight. Increased fruit intake. Food recall: B- yogurt with banana, L- lean cuisine, dinner- lean protein with vegetables. Joined [facility]- went once with her children so far- did 30-40 mins of cardio exercise on the indoor track and treadmill. Struggling with increased stress r/t financial stressor and hx of PTSD. Open to therapy to help with stress at this time. \par \par 4/18/22: Maintained wt, continues to take metformin 2,000 mg daily. Frustrated that she hasn't lost wt. Reviewed recent lab results, HgBA1c increased slightly to 6.2%, Elevated CRP, Elevated WBC, cholesterol levels improved to normal levels. Trying to focus on eating better and less inflammatory diet- more vegetables, fruits, and less sugar. Water intake- adequate, drinks crystal light as well. Continues to struggle with stress. Would like to try another medication for weight loss- does not have coverage for branded weight loss medication. \par \par 5/23/22: Lost 2 lbs, taking metformin 2,000 md daily, topiramate 25 mg QHS, and Lomaira 8 mg daily. Reports sleeping better but feels hot at nighttime, + loose stools and urgency after morning dose of Metformin 1000 mg. Continues to focus on eating better, cut out artificial sweeteners. Water intake improving. Reviewed recent lab results- WBC and CRP still elevated but better. Eating- salads with grilled chicken with ranch dressing, turkey sandwich on WW, etc. Exercising 2 x per week at the [facility]- walking track/ treadmill. \par \par 6/20/22: Lost 6 lbs, 8 lbs total. Tolerating metformin 2,000 mg daily, topiramate 25 mg QHS, and Phentermine 15 mg daily. Feels motivated now that she has seen some progress with weight loss. Making better food choices and continues to go to the [facility] to exercise as able. Saw PCP for elevated WBC & CRP. \par \par 7/25/22: Lost 4 lbs, 12 lbs total. Tolerating medication, reviewed lab result (CRP elevated but improved, HgBA1c down to 5.9%, Insulin level normal, WBC unchanged). Able to eat smaller portions and reports good appetite suppression. Sleep inadequate. \par \par 9/22/22: Lost 7 lbs, down 19 lbs total. Tolerating medication and reports decrease in appetite. Has stopped snacking and eats smaller portions. Sleep inadequate and struggling with high stress level r/t custody lundy with ex- for her daughter. Starting family therapy with her daughter next week. Eating a healthy diet overall.\par \par 11/16/22: Lost 5 lbs, down 24 lbs total. Tolerating medication regimen and wants to continue at this point. Continues to struggle with high stress level related to custody lundy over her daughter.Sleep inadequate due to stress. Continues to go to family therapy with her daughter. Denies finding therapist for herself. \par \par 11/21/22: Able to get an appointment on 12/13/22 to start therapy [facility]. Feels better overall today with her current situation. Reports getting better sleep since starting the higher dose of topiramate, denies side effects. \par \par 12/19/22: Maintained wt, plans on starting therapy next week. Continues to take medication and feels that it helps but off track with intake of vegetables and has been eating more carbs lately.

## 2022-12-19 NOTE — ASSESSMENT
[FreeTextEntry1] : Recommend she focus on weight maintenance for the next weeks with the holidays then get back on track with incorporating more vegetables \par Mental health- plans on seeing therapist next week\par Rx renewal sent for medications\par RTO in 1 month will get f/u labs at that time

## 2023-01-18 ENCOUNTER — RX RENEWAL (OUTPATIENT)
Age: 48
End: 2023-01-18

## 2023-01-25 ENCOUNTER — APPOINTMENT (OUTPATIENT)
Dept: BARIATRICS/WEIGHT MGMT | Facility: CLINIC | Age: 48
End: 2023-01-25
Payer: MEDICAID

## 2023-01-25 VITALS — HEIGHT: 64 IN | BODY MASS INDEX: 33.63 KG/M2 | WEIGHT: 197 LBS

## 2023-01-25 PROCEDURE — 99213 OFFICE O/P EST LOW 20 MIN: CPT | Mod: 95

## 2023-01-25 NOTE — ASSESSMENT
[FreeTextEntry1] : Continue healthy dietary intake- lean protein, vegetables, complex carbs\par Physical Activity- recommend incorporating exercise for mental health benefits. Plans on getting a bike in the spring so she can go bike riding with [place] the bike [place]\par Labs- recheck, lab script sent to pt via email \par Medication- continue current regimen as it is effective\par Scheduled f/u apt on 3/22 at 11

## 2023-01-25 NOTE — HISTORY OF PRESENT ILLNESS
[Other Location: e.g. School (Enter Location, City,State)___] : at [unfilled], at the time of the visit. [Other Location: e.g. Home (Enter Location, City,State)___] : at [unfilled] [Verbal consent obtained from patient] : the patient, [unfilled] [FreeTextEntry1] : 45 y/o Female referred for medical weight loss consultation by Dr. Dean\par Saw Dr. Dean for possible bariatric surgery, at this time does not qualify for the procedure\par Would like to look into options for medical treatment of obesity\par Medical Hx: Obesity, Headaches, Fatigue\par Current weight 225 lbs, lowest adult weight 220 lbs\par Food Recall: B- yogurt, BEC on eng muffin, L-lean cuisine meal, D- steak, mashed potatoes and broccoli\par Snacks on tuna with crackers or pretzels\par Water intake adequate 2-3 large bottles of water + diet snapple + 1 cup of coffee in the morning\par Stress level high- "takes it one day at a time", single parent to 2 children, works FT at [employer]\par Sleep- inadequate, had a sleep study done recently, awaiting results, snores and wakes up often throughout the night, feels exhausted throughout the day\par \par 2/14/22: Lab results reviewed with pt- elevated cholesterol, elevated LDL, elevated CRP, TSH WNL, HgAb1c 6.1%, av glucose elevated, fasting insulin elevated, ALT elevated. Sleep study showed mild sleep apnea. \par \par 3/14/22: Maintained weight, tolerating metformin 2,000 mg daily. Frustrated that she isn't losing weight. Increased fruit intake. Food recall: B- yogurt with banana, L- lean cuisine, dinner- lean protein with vegetables. Joined [employer]- went once with her children so far- did 30-40 mins of cardio exercise on the indoor track and treadmill. Struggling with increased stress r/t financial stressor and hx of PTSD. Open to therapy to help with stress at this time. \par \par 4/18/22: Maintained wt, continues to take metformin 2,000 mg daily. Frustrated that she hasn't lost wt. Reviewed recent lab results, HgBA1c increased slightly to 6.2%, Elevated CRP, Elevated WBC, cholesterol levels improved to normal levels. Trying to focus on eating better and less inflammatory diet- more vegetables, fruits, and less sugar. Water intake- adequate, drinks crystal light as well. Continues to struggle with stress. Would like to try another medication for weight loss- does not have coverage for branded weight loss medication. \par \par 5/23/22: Lost 2 lbs, taking metformin 2,000 md daily, topiramate 25 mg QHS, and Lomaira 8 mg daily. Reports sleeping better but feels hot at nighttime, + loose stools and urgency after morning dose of Metformin 1000 mg. Continues to focus on eating better, cut out artificial sweeteners. Water intake improving. Reviewed recent lab results- WBC and CRP still elevated but better. Eating- salads with grilled chicken with ranch dressing, turkey sandwich on WW, etc. Exercising 2 x per week at the [facility]- walking track/ treadmill. \par \par 6/20/22: Lost 6 lbs, 8 lbs total. Tolerating metformin 2,000 mg daily, topiramate 25 mg QHS, and Phentermine 15 mg daily. Feels motivated now that she has seen some progress with weight loss. Making better food choices and continues to go to the [facility] to exercise as able. Saw PCP for elevated WBC & CRP. \par \par 7/25/22: Lost 4 lbs, 12 lbs total. Tolerating medication, reviewed lab result (CRP elevated but improved, HgBA1c down to 5.9%, Insulin level normal, WBC unchanged). Able to eat smaller portions and reports good appetite suppression. Sleep inadequate. \par \par 9/22/22: Lost 7 lbs, down 19 lbs total. Tolerating medication and reports decrease in appetite. Has stopped snacking and eats smaller portions. Sleep inadequate and struggling with high stress level r/t custody lundy with ex- for her daughter. Starting family therapy with her daughter next week. Eating a healthy diet overall.\par \par 11/16/22: Lost 5 lbs, down 24 lbs total. Tolerating medication regimen and wants to continue at this point. Continues to struggle with high stress level related to custody lundy over her daughter.Sleep inadequate due to stress. Continues to go to family therapy with her daughter. Denies finding therapist for herself. \par \par 11/21/22: Able to get an appointment on 12/13/22 to start therapy through the employee assistance program at [facility]. Feels better overall today with her current situation. Reports getting better sleep since starting the higher dose of topiramate, denies side effects. \par \par 12/19/22: Maintained wt, plans on starting therapy next week. Continues to take medication and feels that it helps but off track with intake of vegetables and has been eating more carbs lately. \par \par 1/25/23: Lost 4 lbs, continues to tolerate medication well. Eating healthy dietary intake. Water intake adequate. Exercise limited by time- cannot fit it into her schedule, but is physically active throughout the day.

## 2023-01-25 NOTE — REASON FOR VISIT
[Follow-Up] : a follow-up visit Hydroquinone Counseling:  Patient advised that medication may result in skin irritation, lightening (hypopigmentation), dryness, and burning.  In the event of skin irritation, the patient was advised to reduce the amount of the drug applied or use it less frequently.  Rarely, spots that are treated with hydroquinone can become darker (pseudoochronosis).  Should this occur, patient instructed to stop medication and call the office. The patient verbalized understanding of the proper use and possible adverse effects of hydroquinone.  All of the patient's questions and concerns were addressed.

## 2023-02-06 ENCOUNTER — RX RENEWAL (OUTPATIENT)
Age: 48
End: 2023-02-06

## 2023-02-15 ENCOUNTER — RX RENEWAL (OUTPATIENT)
Age: 48
End: 2023-02-15

## 2023-03-02 ENCOUNTER — RX RENEWAL (OUTPATIENT)
Age: 48
End: 2023-03-02

## 2023-03-06 ENCOUNTER — RX RENEWAL (OUTPATIENT)
Age: 48
End: 2023-03-06

## 2023-03-20 ENCOUNTER — RX RENEWAL (OUTPATIENT)
Age: 48
End: 2023-03-20

## 2023-03-22 ENCOUNTER — APPOINTMENT (OUTPATIENT)
Dept: BARIATRICS/WEIGHT MGMT | Facility: CLINIC | Age: 48
End: 2023-03-22
Payer: MEDICAID

## 2023-03-22 VITALS — WEIGHT: 196.4 LBS | BODY MASS INDEX: 33.71 KG/M2

## 2023-03-22 PROCEDURE — 99214 OFFICE O/P EST MOD 30 MIN: CPT | Mod: 95

## 2023-03-22 NOTE — HISTORY OF PRESENT ILLNESS
[Other Location: e.g. School (Enter Location, City,State)___] : at [unfilled], at the time of the visit. [Other Location: e.g. Home (Enter Location, City,State)___] : at [unfilled] [Verbal consent obtained from patient] : the patient, [unfilled] [FreeTextEntry1] : 45 y/o Female referred for medical weight loss consultation by Dr. Dean\par Saw Dr. Dean for possible bariatric surgery, at this time does not qualify for the procedure\par Would like to look into options for medical treatment of obesity\par Medical Hx: Obesity, Headaches, Fatigue\par Current weight 225 lbs, lowest adult weight 220 lbs\par Food Recall: B- yogurt, BEC on eng muffin, L-lean cuisine meal, D- steak, mashed potatoes and broccoli\par Snacks on tuna with crackers or pretzels\par Water intake adequate 2-3 large bottles of water + diet snapple + 1 cup of coffee in the morning\par Stress level high- "takes it one day at a time", single parent to 2 children, works FT at [employer]\par Sleep- inadequate, had a sleep study done recently, awaiting results, snores and wakes up often throughout the night, feels exhausted throughout the day\par \par 2/14/22: Lab results reviewed with pt- elevated cholesterol, elevated LDL, elevated CRP, TSH WNL, HgAb1c 6.1%, av glucose elevated, fasting insulin elevated, ALT elevated. Sleep study showed mild sleep apnea. \par \par 3/14/22: Maintained weight, tolerating metformin 2,000 mg daily. Frustrated that she isn't losing weight. Increased fruit intake. Food recall: B- yogurt with banana, L- lean cuisine, dinner- lean protein with vegetables. Joined [employer]- went once with her children so far- did 30-40 mins of cardio exercise on the indoor track and treadmill. Struggling with increased stress r/t financial stressor and hx of PTSD. Open to therapy to help with stress at this time. \par \par 4/18/22: Maintained wt, continues to take metformin 2,000 mg daily. Frustrated that she hasn't lost wt. Reviewed recent lab results, HgBA1c increased slightly to 6.2%, Elevated CRP, Elevated WBC, cholesterol levels improved to normal levels. Trying to focus on eating better and less inflammatory diet- more vegetables, fruits, and less sugar. Water intake- adequate, drinks crystal light as well. Continues to struggle with stress. Would like to try another medication for weight loss- does not have coverage for branded weight loss medication. \par \par 5/23/22: Lost 2 lbs, taking metformin 2,000 md daily, topiramate 25 mg QHS, and Lomaira 8 mg daily. Reports sleeping better but feels hot at nighttime, + loose stools and urgency after morning dose of Metformin 1000 mg. Continues to focus on eating better, cut out artificial sweeteners. Water intake improving. Reviewed recent lab results- WBC and CRP still elevated but better. Eating- salads with grilled chicken with ranch dressing, turkey sandwich on WW, etc. Exercising 2 x per week at the [facility]- walking track/ treadmill. \par \par 6/20/22: Lost 6 lbs, 8 lbs total. Tolerating metformin 2,000 mg daily, topiramate 25 mg QHS, and Phentermine 15 mg daily. Feels motivated now that she has seen some progress with weight loss. Making better food choices and continues to go to the [facility] to exercise as able. Saw PCP for elevated WBC & CRP. \par \par 7/25/22: Lost 4 lbs, 12 lbs total. Tolerating medication, reviewed lab result (CRP elevated but improved, HgBA1c down to 5.9%, Insulin level normal, WBC unchanged). Able to eat smaller portions and reports good appetite suppression. Sleep inadequate. \par \par 9/22/22: Lost 7 lbs, down 19 lbs total. Tolerating medication and reports decrease in appetite. Has stopped snacking and eats smaller portions. Sleep inadequate and struggling with high stress level r/t custody lundy with ex- for her daughter. Starting family therapy with her daughter next week. Eating a healthy diet overall.\par \par 11/16/22: Lost 5 lbs, down 24 lbs total. Tolerating medication regimen and wants to continue at this point. Continues to struggle with high stress level related to custody lundy over her daughter.Sleep inadequate due to stress. Continues to go to family therapy with her daughter. Denies finding therapist for herself. \par \par 11/21/22: Able to get an appointment on 12/13/22 to start therapy through the employee assistance program at [facility]. Feels better overall today with her current situation. Reports getting better sleep since starting the higher dose of topiramate, denies side effects. \par \par 12/19/22: Maintained wt, plans on starting therapy next week. Continues to take medication and feels that it helps but off track with intake of vegetables and has been eating more carbs lately. \par \par 1/25/23: Lost 4 lbs, continues to tolerate medication well. Eating healthy dietary intake. Water intake adequate. Exercise limited by time- cannot fit it into her schedule, but is physically active throughout the day.\par \par 3/22/23: Maintained weight, bought a bike and getting it assembled. Dietary intake- hasn't been able to make home cooked meals due to busier schedule overall. Continues to struggle with stress level r/t daughter moving out of the house but has been doing therapy sessions on Wednesdays which she feels is helping. \par

## 2023-03-28 ENCOUNTER — RX RENEWAL (OUTPATIENT)
Age: 48
End: 2023-03-28

## 2023-04-12 ENCOUNTER — APPOINTMENT (OUTPATIENT)
Dept: BARIATRICS/WEIGHT MGMT | Facility: CLINIC | Age: 48
End: 2023-04-12
Payer: MEDICAID

## 2023-04-12 VITALS — BODY MASS INDEX: 32.95 KG/M2 | HEIGHT: 64 IN | WEIGHT: 193 LBS

## 2023-04-12 PROCEDURE — 99213 OFFICE O/P EST LOW 20 MIN: CPT | Mod: 95

## 2023-04-12 NOTE — HISTORY OF PRESENT ILLNESS
[Other Location: e.g. School (Enter Location, City,State)___] : at [unfilled], at the time of the visit. [Other Location: e.g. Home (Enter Location, City,State)___] : at [unfilled] [Verbal consent obtained from patient] : the patient, [unfilled] [FreeTextEntry1] : 45 y/o Female referred for medical weight loss consultation by Dr. Dean\par Saw Dr. Dean for possible bariatric surgery, at this time does not qualify for the procedure\par Would like to look into options for medical treatment of obesity\par Medical Hx: Obesity, Headaches, Fatigue\par Current weight 225 lbs, lowest adult weight 220 lbs\par Food Recall: B- yogurt, BEC on eng muffin, L-lean cuisine meal, D- steak, mashed potatoes and broccoli\par Snacks on tuna with crackers or pretzels\par Water intake adequate 2-3 large bottles of water + diet snapple + 1 cup of coffee in the morning\par Stress level high- "takes it one day at a time", single parent to 2 children, works FT at [employer]\par Sleep- inadequate, had a sleep study done recently, awaiting results, snores and wakes up often throughout the night, feels exhausted throughout the day\par \par 2/14/22: Lab results reviewed with pt- elevated cholesterol, elevated LDL, elevated CRP, TSH WNL, HgAb1c 6.1%, av glucose elevated, fasting insulin elevated, ALT elevated. Sleep study showed mild sleep apnea. \par \par 3/14/22: Maintained weight, tolerating metformin 2,000 mg daily. Frustrated that she isn't losing weight. Increased fruit intake. Food recall: B- yogurt with banana, L- lean cuisine, dinner- lean protein with vegetables. Joined [employer]- went once with her children so far- did 30-40 mins of cardio exercise on the indoor track and treadmill. Struggling with increased stress r/t financial stressor and hx of PTSD. Open to therapy to help with stress at this time. \par \par 4/18/22: Maintained wt, continues to take metformin 2,000 mg daily. Frustrated that she hasn't lost wt. Reviewed recent lab results, HgBA1c increased slightly to 6.2%, Elevated CRP, Elevated WBC, cholesterol levels improved to normal levels. Trying to focus on eating better and less inflammatory diet- more vegetables, fruits, and less sugar. Water intake- adequate, drinks crystal light as well. Continues to struggle with stress. Would like to try another medication for weight loss- does not have coverage for branded weight loss medication. \par \par 5/23/22: Lost 2 lbs, taking metformin 2,000 md daily, topiramate 25 mg QHS, and Lomaira 8 mg daily. Reports sleeping better but feels hot at nighttime, + loose stools and urgency after morning dose of Metformin 1000 mg. Continues to focus on eating better, cut out artificial sweeteners. Water intake improving. Reviewed recent lab results- WBC and CRP still elevated but better. Eating- salads with grilled chicken with ranch dressing, turkey sandwich on WW, etc. Exercising 2 x per week at the [facility]- walking track/ treadmill. \par \par 6/20/22: Lost 6 lbs, 8 lbs total. Tolerating metformin 2,000 mg daily, topiramate 25 mg QHS, and Phentermine 15 mg daily. Feels motivated now that she has seen some progress with weight loss. Making better food choices and continues to go to the [facility] to exercise as able. Saw PCP for elevated WBC & CRP. \par \par 7/25/22: Lost 4 lbs, 12 lbs total. Tolerating medication, reviewed lab result (CRP elevated but improved, HgBA1c down to 5.9%, Insulin level normal, WBC unchanged). Able to eat smaller portions and reports good appetite suppression. Sleep inadequate. \par \par 9/22/22: Lost 7 lbs, down 19 lbs total. Tolerating medication and reports decrease in appetite. Has stopped snacking and eats smaller portions. Sleep inadequate and struggling with high stress level r/t custody lundy with ex- for her daughter. Starting family therapy with her daughter next week. Eating a healthy diet overall.\par \par 11/16/22: Lost 5 lbs, down 24 lbs total. Tolerating medication regimen and wants to continue at this point. Continues to struggle with high stress level related to custody lundy over her daughter.Sleep inadequate due to stress. Continues to go to family therapy with her daughter. Denies finding therapist for herself. \par \par 11/21/22: Able to get an appointment on 12/13/22 to start therapy through the employee assistance program at [facility]. Feels better overall today with her current situation. Reports getting better sleep since starting the higher dose of topiramate, denies side effects. \par \par 12/19/22: Maintained wt, plans on starting therapy next week. Continues to take medication and feels that it helps but off track with intake of vegetables and has been eating more carbs lately. \par \par 1/25/23: Lost 4 lbs, continues to tolerate medication well. Eating healthy dietary intake. Water intake adequate. Exercise limited by time- cannot fit it into her schedule, but is physically active throughout the day.\par \par 3/22/23: Maintained weight, bought a bike and getting it assembled. Dietary intake- hasn't been able to make home cooked meals due to busier schedule overall. Continues to struggle with stress level r/t daughter moving out of the house but has been doing therapy sessions on Wednesdays which she feels is helping. \par \par 4/12/23: Lost 3 lbs. Eating more hearty foods throughout the winter. Plans on changing to salads over the summer months and starting to use bike for exercise. Concerned that she was hitting a platue with weight loss, but didn't’t realize that she lost 3 lbs this month. \par

## 2023-04-12 NOTE — ASSESSMENT
[FreeTextEntry1] : Celebrated her success with losing 14 % of total body weight\par Continue healthy lifestyle- inc exercise regimen and focus on eating healthy dietary intkae\par Continue on current medication regimen\par RTO in 1month - scheduled a f/u TH apt on 5/10- at 11

## 2023-04-13 ENCOUNTER — RX RENEWAL (OUTPATIENT)
Age: 48
End: 2023-04-13

## 2023-04-25 ENCOUNTER — RX RENEWAL (OUTPATIENT)
Age: 48
End: 2023-04-25

## 2023-04-26 ENCOUNTER — APPOINTMENT (OUTPATIENT)
Dept: BARIATRICS/WEIGHT MGMT | Facility: CLINIC | Age: 48
End: 2023-04-26

## 2023-04-26 ENCOUNTER — TRANSCRIPTION ENCOUNTER (OUTPATIENT)
Age: 48
End: 2023-04-26

## 2023-05-10 ENCOUNTER — APPOINTMENT (OUTPATIENT)
Dept: BARIATRICS/WEIGHT MGMT | Facility: CLINIC | Age: 48
End: 2023-05-10
Payer: MEDICAID

## 2023-05-10 PROCEDURE — 99213 OFFICE O/P EST LOW 20 MIN: CPT | Mod: 95

## 2023-05-10 RX ORDER — TOPIRAMATE 50 MG/1
50 TABLET, FILM COATED ORAL
Qty: 30 | Refills: 2 | Status: COMPLETED | COMMUNITY
Start: 2022-04-21 | End: 2023-05-10

## 2023-05-10 RX ORDER — TOPIRAMATE 25 MG/1
25 TABLET, FILM COATED ORAL
Qty: 30 | Refills: 0 | Status: COMPLETED | COMMUNITY
Start: 2022-04-18 | End: 2023-05-10

## 2023-05-10 RX ORDER — PHENTERMINE HYDROCHLORIDE 15 MG/1
15 CAPSULE ORAL
Qty: 30 | Refills: 0 | Status: COMPLETED | COMMUNITY
Start: 2022-05-23 | End: 2023-05-10

## 2023-05-10 NOTE — ASSESSMENT
[FreeTextEntry1] : Continue lifestyle modification, continue to focus on increasing exercise regimen\par Medication- continue current medication regimen.Rx renewals send \par Scheduled f/u apt on  7/5 at 11

## 2023-05-15 ENCOUNTER — RX RENEWAL (OUTPATIENT)
Age: 48
End: 2023-05-15

## 2023-05-16 ENCOUNTER — RX RENEWAL (OUTPATIENT)
Age: 48
End: 2023-05-16

## 2023-05-30 ENCOUNTER — RX RENEWAL (OUTPATIENT)
Age: 48
End: 2023-05-30

## 2023-06-12 ENCOUNTER — RX RENEWAL (OUTPATIENT)
Age: 48
End: 2023-06-12

## 2023-06-14 ENCOUNTER — NON-APPOINTMENT (OUTPATIENT)
Age: 48
End: 2023-06-14

## 2023-06-26 ENCOUNTER — RX RENEWAL (OUTPATIENT)
Age: 48
End: 2023-06-26

## 2023-07-05 ENCOUNTER — APPOINTMENT (OUTPATIENT)
Dept: BARIATRICS/WEIGHT MGMT | Facility: CLINIC | Age: 48
End: 2023-07-05
Payer: MEDICAID

## 2023-07-05 VITALS — BODY MASS INDEX: 33.47 KG/M2 | WEIGHT: 195 LBS

## 2023-07-05 DIAGNOSIS — R79.82 ELEVATED C-REACTIVE PROTEIN (CRP): ICD-10-CM

## 2023-07-05 PROCEDURE — 99214 OFFICE O/P EST MOD 30 MIN: CPT | Mod: 95

## 2023-07-05 NOTE — ASSESSMENT
[FreeTextEntry1] : Dietary- rec limiting simple carbs, discontinue eating by 7/8 pm, may be interested in IF\par Exercise- inc activity, bike riding, plan alternative option\par Labs- recheck HgBA1c, Cholesterol and CMP and CRP\par Meds- inc phentermine dose to 37.5mg daily, continue metformin and topiramate\par May try for ozempic if covered\par RTO in 3 weeks\par \par

## 2023-07-05 NOTE — HISTORY OF PRESENT ILLNESS
[Other Location: e.g. School (Enter Location, City,State)___] : at [unfilled], at the time of the visit. [Other Location: e.g. Home (Enter Location, City,State)___] : at [unfilled] [Verbal consent obtained from patient] : the patient, [unfilled] [FreeTextEntry1] : 45 y/o Female referred for medical weight loss consultation by Dr. Dean\par Saw Dr. Dean for possible bariatric surgery, at this time does not qualify for the procedure\par Would like to look into options for medical treatment of obesity\par Medical Hx: Obesity, Headaches, Fatigue\par Current weight 225 lbs, lowest adult weight 220 lbs\par Food Recall: B- yogurt, BEC on eng muffin, L-lean cuisine meal, D- steak, mashed potatoes and broccoli\par Snacks on tuna with crackers or pretzels\par Water intake adequate 2-3 large bottles of water + diet snapple + 1 cup of coffee in the morning\par Stress level high- "takes it one day at a time", single parent to 2 children, works FT at [email or web address]\par Sleep- inadequate, had a sleep study done recently, awaiting results, snores and wakes up often throughout the night, feels exhausted throughout the day\par \par 2/14/22: Lab results reviewed with pt- elevated cholesterol, elevated LDL, elevated CRP, TSH WNL, HgAb1c 6.1%, av glucose elevated, fasting insulin elevated, ALT elevated. Sleep study showed mild sleep apnea. \par \par 3/14/22: Maintained weight, tolerating metformin 2,000 mg daily. Frustrated that she isn't losing weight. Increased fruit intake. Food recall: B- yogurt with banana, L- lean cuisine, dinner- lean protein with vegetables. Joined [employer]- went once with her children so far- did 30-40 mins of cardio exercise on the indoor track and treadmill. Struggling with increased stress r/t financial stressor and hx of PTSD. Open to therapy to help with stress at this time. \par \par 4/18/22: Maintained wt, continues to take metformin 2,000 mg daily. Frustrated that she hasn't lost wt. Reviewed recent lab results, HgBA1c increased slightly to 6.2%, Elevated CRP, Elevated WBC, cholesterol levels improved to normal levels. Trying to focus on eating better and less inflammatory diet- more vegetables, fruits, and less sugar. Water intake- adequate, drinks crystal light as well. Continues to struggle with stress. Would like to try another medication for weight loss- does not have coverage for branded weight loss medication. \par \par 5/23/22: Lost 2 lbs, taking metformin 2,000 md daily, topiramate 25 mg QHS, and Lomaira 8 mg daily. Reports sleeping better but feels hot at nighttime, + loose stools and urgency after morning dose of Metformin 1000 mg. Continues to focus on eating better, cut out artificial sweeteners. Water intake improving. Reviewed recent lab results- WBC and CRP still elevated but better. Eating- salads with grilled chicken with ranch dressing, turkey sandwich on WW, etc. Exercising 2 x per week at the [facility]- walking track/ treadmill. \par \par 6/20/22: Lost 6 lbs, 8 lbs total. Tolerating metformin 2,000 mg daily, topiramate 25 mg QHS, and Phentermine 15 mg daily. Feels motivated now that she has seen some progress with weight loss. Making better food choices and continues to go to the [facility] to exercise as able. Saw PCP for elevated WBC & CRP. \par \par 7/25/22: Lost 4 lbs, 12 lbs total. Tolerating medication, reviewed lab result (CRP elevated but improved, HgBA1c down to 5.9%, Insulin level normal, WBC unchanged). Able to eat smaller portions and reports good appetite suppression. Sleep inadequate. \par \par 9/22/22: Lost 7 lbs, down 19 lbs total. Tolerating medication and reports decrease in appetite. Has stopped snacking and eats smaller portions. Sleep inadequate and struggling with high stress level r/t custody lundy with ex- for her daughter. Starting family therapy with her daughter next week. Eating a healthy diet overall.\par \par 11/16/22: Lost 5 lbs, down 24 lbs total. Tolerating medication regimen and wants to continue at this point. Continues to struggle with high stress level related to custody lundy over her daughter.Sleep inadequate due to stress. Continues to go to family therapy with her daughter. Denies finding therapist for herself. \par \par 11/21/22: Able to get an appointment on 12/13/22 to start therapy through the employee assistance program at [facility]. Feels better overall today with her current situation. Reports getting better sleep since starting the higher dose of topiramate, denies side effects. \par \par 12/19/22: Maintained wt, plans on starting therapy next week. Continues to take medication and feels that it helps but off track with intake of vegetables and has been eating more carbs lately. \par \par 1/25/23: Lost 4 lbs, continues to tolerate medication well. Eating healthy dietary intake. Water intake adequate. Exercise limited by time- cannot fit it into her schedule, but is physically active throughout the day.\par \par 3/22/23: Maintained weight, bought a bike and getting it assembled. Dietary intake- hasn't been able to make home cooked meals due to busier schedule overall. Continues to struggle with stress level r/t daughter moving out of the house but has been doing therapy sessions on Wednesdays which she feels is helping. \par \par 4/12/23: Lost 3 lbs. Eating more hearty foods throughout the winter. Plans on changing to salads over the summer months and starting to use bike for exercise. Concerned that she was hitting a platue with weight loss, but didn't’t realize that she lost 3 lbs this month. \par \par 5/10/23: Maintained weight. Eating more salad, water intake adequate. Starting seeing someone which makes her feel happier overall. Continues to tolerate medication regimen at this time. Taking metformin 2 grams daily, phentermine 30 mg daily, and topiramate 75 mg QHS. \par \par 7/5/23: Gained 3 lbs. Continues to eat well overall. Hasn't been able to bike ride due to the weather. Denies having cravings, but has been having sweets intermittently. Water intake- adequate. Sleeping well while on topiramate.

## 2023-07-11 ENCOUNTER — NON-APPOINTMENT (OUTPATIENT)
Age: 48
End: 2023-07-11

## 2023-07-17 ENCOUNTER — RX RENEWAL (OUTPATIENT)
Age: 48
End: 2023-07-17

## 2023-07-20 ENCOUNTER — RX RENEWAL (OUTPATIENT)
Age: 48
End: 2023-07-20

## 2023-07-27 ENCOUNTER — RX RENEWAL (OUTPATIENT)
Age: 48
End: 2023-07-27

## 2023-08-02 ENCOUNTER — APPOINTMENT (OUTPATIENT)
Dept: BARIATRICS/WEIGHT MGMT | Facility: CLINIC | Age: 48
End: 2023-08-02
Payer: MEDICAID

## 2023-08-02 VITALS — WEIGHT: 192 LBS | BODY MASS INDEX: 32.96 KG/M2

## 2023-08-02 PROCEDURE — 99214 OFFICE O/P EST MOD 30 MIN: CPT | Mod: 95

## 2023-08-02 NOTE — HISTORY OF PRESENT ILLNESS
[FreeTextEntry1] : 45 y/o Female referred for medical weight loss consultation by Dr. Dean Saw Dr. Dean for possible bariatric surgery, at this time does not qualify for the procedure Would like to look into options for medical treatment of obesity Medical Hx: Obesity, Headaches, Fatigue Current weight 225 lbs, lowest adult weight 220 lbs Food Recall: B- yogurt, BEC on eng muffin, L-lean cuisine meal, D- steak, mashed potatoes and broccoli Snacks on tuna with crackers or pretzels Water intake adequate 2-3 large bottles of water + diet snapple + 1 cup of coffee in the morning Stress level high- "takes it one day at a time", single parent to 2 children, works FT at [employer] Sleep- inadequate, had a sleep study done recently, awaiting results, snores and wakes up often throughout the night, feels exhausted throughout the day  2/14/22: Lab results reviewed with pt- elevated cholesterol, elevated LDL, elevated CRP, TSH WNL, HgAb1c 6.1%, av glucose elevated, fasting insulin elevated, ALT elevated. Sleep study showed mild sleep apnea.   3/14/22: Maintained weight, tolerating metformin 2,000 mg daily. Frustrated that she isn't losing weight. Increased fruit intake. Food recall: B- yogurt with banana, L- lean cuisine, dinner- lean protein with vegetables. Joined [employer]- went once with her children so far- did 30-40 mins of cardio exercise on the indoor track and treadmill. Struggling with increased stress r/t financial stressor and hx of PTSD. Open to therapy to help with stress at this time.   4/18/22: Maintained wt, continues to take metformin 2,000 mg daily. Frustrated that she hasn't lost wt. Reviewed recent lab results, HgBA1c increased slightly to 6.2%, Elevated CRP, Elevated WBC, cholesterol levels improved to normal levels. Trying to focus on eating better and less inflammatory diet- more vegetables, fruits, and less sugar. Water intake- adequate, drinks crystal light as well. Continues to struggle with stress. Would like to try another medication for weight loss- does not have coverage for branded weight loss medication.   5/23/22: Lost 2 lbs, taking metformin 2,000 md daily, topiramate 25 mg QHS, and Lomaira 8 mg daily. Reports sleeping better but feels hot at nighttime, + loose stools and urgency after morning dose of Metformin 1000 mg. Continues to focus on eating better, cut out artificial sweeteners. Water intake improving. Reviewed recent lab results- WBC and CRP still elevated but better. Eating- salads with grilled chicken with ranch dressing, turkey sandwich on WW, etc. Exercising 2 x per week at the [facility]- walking track/ treadmill.   6/20/22: Lost 6 lbs, 8 lbs total. Tolerating metformin 2,000 mg daily, topiramate 25 mg QHS, and Phentermine 15 mg daily. Feels motivated now that she has seen some progress with weight loss. Making better food choices and continues to go to the [facility] to exercise as able. Saw PCP for elevated WBC & CRP.   7/25/22: Lost 4 lbs, 12 lbs total. Tolerating medication, reviewed lab result (CRP elevated but improved, HgBA1c down to 5.9%, Insulin level normal, WBC unchanged). Able to eat smaller portions and reports good appetite suppression. Sleep inadequate.   9/22/22: Lost 7 lbs, down 19 lbs total. Tolerating medication and reports decrease in appetite. Has stopped snacking and eats smaller portions. Sleep inadequate and struggling with high stress level r/t custody lundy with ex- for her daughter. Starting family therapy with her daughter next week. Eating a healthy diet overall.  11/16/22: Lost 5 lbs, down 24 lbs total. Tolerating medication regimen and wants to continue at this point. Continues to struggle with high stress level related to custody lundy over her daughter.Sleep inadequate due to stress. Continues to go to family therapy with her daughter. Denies finding therapist for herself.   11/21/22: Able to get an appointment on 12/13/22 to start therapy through the employee assistance program at [facility]. Feels better overall today with her current situation. Reports getting better sleep since starting the higher dose of topiramate, denies side effects.   12/19/22: Maintained wt, plans on starting therapy next week. Continues to take medication and feels that it helps but off track with intake of vegetables and has been eating more carbs lately.   1/25/23: Lost 4 lbs, continues to tolerate medication well. Eating healthy dietary intake. Water intake adequate. Exercise limited by time- cannot fit it into her schedule, but is physically active throughout the day.  3/22/23: Maintained weight, bought a bike and getting it assembled. Dietary intake- hasn't been able to make home cooked meals due to busier schedule overall. Continues to struggle with stress level r/t daughter moving out of the house but has been doing therapy sessions on Wednesdays which she feels is helping.   4/12/23: Lost 3 lbs. Eating more hearty foods throughout the winter. Plans on changing to salads over the summer months and starting to use bike for exercise. Concerned that she was hitting a platue with weight loss, but didn't't realize that she lost 3 lbs this month.   5/10/23: Maintained weight. Eating more salad, water intake adequate. Starting seeing someone which makes her feel happier overall. Continues to tolerate medication regimen at this time. Taking metformin 2 grams daily, phentermine 30 mg daily, and topiramate 75 mg QHS.   7/5/23: Gained 3 lbs. Continues to eat well overall. Hasn't been able to bike ride due to the weather. Denies having cravings, but has been having sweets intermittently. Water intake- adequate. Sleeping well while on topiramate.   8/2/23: Lost 3 lbs, skipped breakfast at times because she wasn't hungry. Recently increased dose of medication. Exercise is limited currently. Starting a new job in [employer] in September- hybrid remote. Feels that she will have more time for walking/ exercise.

## 2023-08-02 NOTE — REASON FOR VISIT
[Other Location: e.g. School (Enter Location, City,State)___] : at [unfilled], at the time of the visit. [Other Location: e.g. Home (Enter Location, City,State)___] : at [unfilled] [Follow-Up] : a follow-up visit

## 2023-08-02 NOTE — ASSESSMENT
[FreeTextEntry1] : Continues to eat healthier. Rec- start incorporating exercise as able with busy schedule Labs- plans on having these redone at PCP apt for annual physical Med- continue Metformin, Phentermine, & Topiramate  RTO in 1month

## 2023-08-21 ENCOUNTER — RX RENEWAL (OUTPATIENT)
Age: 48
End: 2023-08-21

## 2023-09-12 ENCOUNTER — RX RENEWAL (OUTPATIENT)
Age: 48
End: 2023-09-12

## 2023-09-13 ENCOUNTER — APPOINTMENT (OUTPATIENT)
Dept: BARIATRICS/WEIGHT MGMT | Facility: CLINIC | Age: 48
End: 2023-09-13
Payer: MEDICAID

## 2023-09-13 PROCEDURE — 99214 OFFICE O/P EST MOD 30 MIN: CPT | Mod: 95

## 2023-09-18 ENCOUNTER — RX RENEWAL (OUTPATIENT)
Age: 48
End: 2023-09-18

## 2023-10-09 ENCOUNTER — RX RENEWAL (OUTPATIENT)
Age: 48
End: 2023-10-09

## 2023-10-17 ENCOUNTER — APPOINTMENT (OUTPATIENT)
Dept: BARIATRICS/WEIGHT MGMT | Facility: CLINIC | Age: 48
End: 2023-10-17

## 2023-10-30 ENCOUNTER — APPOINTMENT (OUTPATIENT)
Dept: BARIATRICS/WEIGHT MGMT | Facility: CLINIC | Age: 48
End: 2023-10-30
Payer: MEDICAID

## 2023-10-30 VITALS — BODY MASS INDEX: 32.78 KG/M2 | WEIGHT: 192 LBS | HEIGHT: 64 IN

## 2023-10-30 PROCEDURE — 99213 OFFICE O/P EST LOW 20 MIN: CPT | Mod: 95

## 2023-10-30 RX ORDER — METFORMIN ER 500 MG 500 MG/1
500 TABLET ORAL
Qty: 120 | Refills: 0 | Status: COMPLETED | COMMUNITY
Start: 2022-02-14 | End: 2023-10-30

## 2023-10-30 RX ORDER — SEMAGLUTIDE 0.68 MG/ML
2 INJECTION, SOLUTION SUBCUTANEOUS
Qty: 1 | Refills: 0 | Status: COMPLETED | COMMUNITY
Start: 2023-07-05 | End: 2023-10-30

## 2023-11-13 ENCOUNTER — RX RENEWAL (OUTPATIENT)
Age: 48
End: 2023-11-13

## 2023-12-13 ENCOUNTER — APPOINTMENT (OUTPATIENT)
Dept: BARIATRICS/WEIGHT MGMT | Facility: CLINIC | Age: 48
End: 2023-12-13
Payer: MEDICAID

## 2023-12-13 VITALS — HEIGHT: 64 IN | WEIGHT: 195 LBS | BODY MASS INDEX: 33.29 KG/M2

## 2023-12-13 PROCEDURE — 99215 OFFICE O/P EST HI 40 MIN: CPT | Mod: 95

## 2023-12-13 NOTE — ASSESSMENT
[FreeTextEntry1] : Continue to focus on healthy lifestyle modification & celebrated her success with our program [facility] - called Dr. Chandra to schedule a f/u apt- pt has an apt this Saturday to figure out if she plans on continuing treatment with him or changing to a different provider bc mental health treatment is a priority at this time. Encouraged to continue therapy sessions. Rec deep breathing and watching headspace to help dec stress level  Rec exercising to help with stress as well  Med- Continue current treatment plan RTO in 1 month for f/u
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
## 2024-01-18 ENCOUNTER — RX RENEWAL (OUTPATIENT)
Age: 49
End: 2024-01-18

## 2024-01-22 ENCOUNTER — APPOINTMENT (OUTPATIENT)
Dept: BARIATRICS/WEIGHT MGMT | Facility: CLINIC | Age: 49
End: 2024-01-22
Payer: MEDICAID

## 2024-01-22 VITALS — BODY MASS INDEX: 33.29 KG/M2 | HEIGHT: 64 IN | WEIGHT: 195 LBS

## 2024-01-22 PROCEDURE — 99214 OFFICE O/P EST MOD 30 MIN: CPT | Mod: 95

## 2024-01-22 RX ORDER — TOPIRAMATE 25 MG/1
25 TABLET, FILM COATED ORAL
Qty: 90 | Refills: 0 | Status: COMPLETED | COMMUNITY
Start: 2022-11-16 | End: 2024-01-22

## 2024-01-22 RX ORDER — PHENTERMINE HYDROCHLORIDE 15 MG/1
15 CAPSULE ORAL
Qty: 60 | Refills: 1 | Status: COMPLETED | COMMUNITY
Start: 2023-10-30 | End: 2024-01-22

## 2024-01-22 RX ORDER — PHENTERMINE HYDROCHLORIDE 37.5 MG/1
37.5 CAPSULE ORAL
Qty: 30 | Refills: 1 | Status: COMPLETED | COMMUNITY
Start: 2023-07-05 | End: 2024-01-22
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
## 2024-01-22 NOTE — ASSESSMENT
[FreeTextEntry1] : Continue to focus on healthy lifestyle with increasing activity level and making better food choices Continue current medication treatment, will keep dose of phentermine at 30 mg and topiramate 100 mg QHS. Discussed possible medication interactions with her new medicine zoloft and symptoms of serotonin syndrome to watch for.  Continue to see psychiatrist Didn't feel that the therapist was helpful overall, may benefit from finding a new therapist  RTO in 1 month for f/u

## 2024-02-28 ENCOUNTER — NON-APPOINTMENT (OUTPATIENT)
Age: 49
End: 2024-02-28

## 2024-03-12 ENCOUNTER — APPOINTMENT (OUTPATIENT)
Dept: BARIATRICS/WEIGHT MGMT | Facility: CLINIC | Age: 49
End: 2024-03-12
Payer: MEDICAID

## 2024-03-12 VITALS
SYSTOLIC BLOOD PRESSURE: 115 MMHG | HEART RATE: 96 BPM | RESPIRATION RATE: 16 BRPM | WEIGHT: 199 LBS | HEIGHT: 64 IN | DIASTOLIC BLOOD PRESSURE: 75 MMHG | BODY MASS INDEX: 33.97 KG/M2 | OXYGEN SATURATION: 98 %

## 2024-03-12 DIAGNOSIS — R73.09 OTHER ABNORMAL GLUCOSE: ICD-10-CM

## 2024-03-12 DIAGNOSIS — F32.A DEPRESSION, UNSPECIFIED: ICD-10-CM

## 2024-03-12 PROCEDURE — 99214 OFFICE O/P EST MOD 30 MIN: CPT

## 2024-03-12 NOTE — ASSESSMENT
[FreeTextEntry1] : Dietary- given sheet with healthier meal options. Rec- premier protein shake as meal replacement or greek yogurt with berries or egg bites. Rec having a rotisserri chicken in fridge and steam able vegetables for healthier meal option Exercise- rec incorporating more activity or structured exercise Med- continue with current treatment plan- making branded qsymia with generic options since current insurance does not cover medical weight management medicines and pt has tolerated well.  RTO in 1 month
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
## 2024-04-22 ENCOUNTER — APPOINTMENT (OUTPATIENT)
Dept: BARIATRICS/WEIGHT MGMT | Facility: CLINIC | Age: 49
End: 2024-04-22
Payer: MEDICAID

## 2024-04-22 PROCEDURE — 99213 OFFICE O/P EST LOW 20 MIN: CPT
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
## 2024-04-22 NOTE — ASSESSMENT
[FreeTextEntry1] : Rec meal planning- will make meatloaf, gets frozen vegetables. Rec turkey or chicken meatballs, [place] chicken, likes salmon. Likes summer salad  Exercise- plans on walking outdoors with friends to inc exercise Meds- continue with phentermine and topiramate  RTO in 1 month for f/u

## 2024-05-20 ENCOUNTER — APPOINTMENT (OUTPATIENT)
Dept: BARIATRICS/WEIGHT MGMT | Facility: CLINIC | Age: 49
End: 2024-05-20
Payer: MEDICAID

## 2024-05-20 VITALS — HEIGHT: 64 IN | BODY MASS INDEX: 34.31 KG/M2 | WEIGHT: 201 LBS

## 2024-05-20 DIAGNOSIS — E66.9 OBESITY, UNSPECIFIED: ICD-10-CM

## 2024-05-20 DIAGNOSIS — R51.9 HEADACHE, UNSPECIFIED: ICD-10-CM

## 2024-05-20 PROCEDURE — G2211 COMPLEX E/M VISIT ADD ON: CPT | Mod: NC,1L

## 2024-05-20 PROCEDURE — 99213 OFFICE O/P EST LOW 20 MIN: CPT

## 2024-05-20 RX ORDER — PHENTERMINE HYDROCHLORIDE 37.5 MG/1
37.5 CAPSULE ORAL
Qty: 30 | Refills: 1 | Status: ACTIVE | COMMUNITY
Start: 2023-03-22 | End: 1900-01-01

## 2024-05-20 RX ORDER — TOPIRAMATE 25 MG/1
25 CAPSULE, EXTENDED RELEASE ORAL
Qty: 60 | Refills: 1 | Status: ACTIVE | COMMUNITY
Start: 2024-05-20 | End: 1900-01-01

## 2024-05-20 NOTE — ASSESSMENT
[FreeTextEntry1] : Increase medication dose of phentermine and topiramate to help with weight gain Meal prepping and planning  for healthier dietary intake  Plans on exercising on breaks throughout the week, walk or bike ride RTO in 1 month for f.u
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
## 2024-06-19 RX ORDER — TOPIRAMATE 100 MG/1
100 TABLET, FILM COATED ORAL
Qty: 30 | Refills: 1 | Status: ACTIVE | COMMUNITY
Start: 2023-12-13 | End: 1900-01-01

## 2024-07-03 ENCOUNTER — APPOINTMENT (OUTPATIENT)
Dept: BARIATRICS/WEIGHT MGMT | Facility: CLINIC | Age: 49
End: 2024-07-03
Payer: COMMERCIAL

## 2024-07-03 DIAGNOSIS — F32.A DEPRESSION, UNSPECIFIED: ICD-10-CM

## 2024-07-03 DIAGNOSIS — R73.09 OTHER ABNORMAL GLUCOSE: ICD-10-CM

## 2024-07-03 DIAGNOSIS — E66.9 OBESITY, UNSPECIFIED: ICD-10-CM

## 2024-07-03 PROCEDURE — G2211 COMPLEX E/M VISIT ADD ON: CPT

## 2024-07-03 PROCEDURE — 99214 OFFICE O/P EST MOD 30 MIN: CPT

## 2024-07-03 RX ORDER — PHENTERMINE HYDROCHLORIDE 37.5 MG/1
37.5 CAPSULE ORAL
Qty: 30 | Refills: 1 | Status: ACTIVE | COMMUNITY
Start: 2024-07-03 | End: 1900-01-01

## 2024-07-31 ENCOUNTER — APPOINTMENT (OUTPATIENT)
Dept: BARIATRICS/WEIGHT MGMT | Facility: CLINIC | Age: 49
End: 2024-07-31
Payer: COMMERCIAL

## 2024-07-31 VITALS — BODY MASS INDEX: 34.33 KG/M2 | WEIGHT: 200 LBS

## 2024-07-31 DIAGNOSIS — R51.9 HEADACHE, UNSPECIFIED: ICD-10-CM

## 2024-07-31 DIAGNOSIS — R73.09 OTHER ABNORMAL GLUCOSE: ICD-10-CM

## 2024-07-31 DIAGNOSIS — E66.9 OBESITY, UNSPECIFIED: ICD-10-CM

## 2024-07-31 PROCEDURE — 99213 OFFICE O/P EST LOW 20 MIN: CPT

## 2024-07-31 PROCEDURE — G2211 COMPLEX E/M VISIT ADD ON: CPT

## 2024-07-31 NOTE — ASSESSMENT
[FreeTextEntry1] : Eating better lately since she is working on decreasing stress  Continue to utilize headspace, given additional resources for telehealth therapy services to call and seek therapy session, open to starting this in September when routine is more consistent with work schedule Continue to focus on increasing activity even if it is functional exercise Continue medicine treatment RTO in 1 month
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
## 2024-08-01 RX ORDER — TOPIRAMATE 25 MG/1
25 TABLET, FILM COATED ORAL
Qty: 90 | Refills: 2 | Status: ACTIVE | COMMUNITY
Start: 2024-08-01 | End: 1900-01-01

## 2024-08-20 ENCOUNTER — TRANSCRIPTION ENCOUNTER (OUTPATIENT)
Age: 49
End: 2024-08-20

## 2024-08-28 ENCOUNTER — APPOINTMENT (OUTPATIENT)
Dept: BARIATRICS/WEIGHT MGMT | Facility: CLINIC | Age: 49
End: 2024-08-28
Payer: COMMERCIAL

## 2024-08-28 VITALS — BODY MASS INDEX: 34.15 KG/M2 | WEIGHT: 200 LBS | HEIGHT: 64 IN

## 2024-08-28 DIAGNOSIS — F32.A DEPRESSION, UNSPECIFIED: ICD-10-CM

## 2024-08-28 DIAGNOSIS — R51.9 HEADACHE, UNSPECIFIED: ICD-10-CM

## 2024-08-28 DIAGNOSIS — E66.9 OBESITY, UNSPECIFIED: ICD-10-CM

## 2024-08-28 PROCEDURE — G2211 COMPLEX E/M VISIT ADD ON: CPT

## 2024-08-28 PROCEDURE — 99213 OFFICE O/P EST LOW 20 MIN: CPT

## 2024-08-28 RX ORDER — SERTRALINE HYDROCHLORIDE 100 MG/1
100 TABLET, FILM COATED ORAL
Refills: 0 | Status: ACTIVE | COMMUNITY

## 2024-08-28 RX ORDER — TRAZODONE HYDROCHLORIDE 50 MG/1
50 TABLET ORAL
Refills: 0 | Status: ACTIVE | COMMUNITY
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
## 2024-08-28 NOTE — ASSESSMENT
[FreeTextEntry1] : - Summary : The plan is to potentially discontinue phentermine, and consider adding Wellbutrin for weight loss and mood support after consulting Carol's prescribing provider. Incorporating a therapist is also recommended. - Plan : - Continue current medication regimen, consult psychiatric provider for addition of wellbutrin and discontinuation of phentermine -Continue healthy lifestyle and increasing exercise by walking with friends - Recommend starting therapy for additional support

## 2024-09-30 ENCOUNTER — APPOINTMENT (OUTPATIENT)
Dept: BARIATRICS/WEIGHT MGMT | Facility: CLINIC | Age: 49
End: 2024-09-30
Payer: COMMERCIAL

## 2024-09-30 VITALS — WEIGHT: 200 LBS | HEIGHT: 64 IN | BODY MASS INDEX: 34.15 KG/M2

## 2024-09-30 DIAGNOSIS — R73.09 OTHER ABNORMAL GLUCOSE: ICD-10-CM

## 2024-09-30 DIAGNOSIS — R51.9 HEADACHE, UNSPECIFIED: ICD-10-CM

## 2024-09-30 DIAGNOSIS — E66.9 OBESITY, UNSPECIFIED: ICD-10-CM

## 2024-09-30 PROCEDURE — G2211 COMPLEX E/M VISIT ADD ON: CPT

## 2024-09-30 PROCEDURE — 99214 OFFICE O/P EST MOD 30 MIN: CPT
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
## 2024-09-30 NOTE — ASSESSMENT
[FreeTextEntry1] : Discontinue phentermine per pt request Can inc topiramate to 175 mg as prescribed If she wants to resume phentermine, need to check BP  Enc to continue with current medication regimen and really focus on lifestyle modification- increasing exercise and limiting simple carbs Continue to focus on sleep schedule RTO in 1 month, scheduled an in- person apt

## 2024-10-11 NOTE — HISTORY OF PRESENT ILLNESS
[Other Location: e.g. School (Enter Location, City,State)___] : at [unfilled], at the time of the visit. [Other Location: e.g. Home (Enter Location, City,State)___] : at [unfilled] yes [Verbal consent obtained from patient] : the patient, [unfilled] [FreeTextEntry1] : 45 y/o Female referred for medical weight loss consultation by Dr. Dean\par Saw Dr. Dean for possible bariatric surgery, at this time does not qualify for the procedure\par Would like to look into options for medical treatment of obesity\par Medical Hx: Obesity, Headaches, Fatigue\par Current weight 225 lbs, lowest adult weight 220 lbs\par Food Recall: B- yogurt, BEC on eng muffin, L-lean cuisine meal, D- steak, mashed potatoes and broccoli\par Snacks on tuna with crackers or pretzels\par Water intake adequate 2-3 large bottles of water + diet snapple + 1 cup of coffee in the morning\par Stress level high- "takes it one day at a time", single parent to 2 children, works FT at [employer]\par Sleep- inadequate, had a sleep study done recently, awaiting results, snores and wakes up often throughout the night, feels exhausted throughout the day\par \par 2/14/22: Lab results reviewed with pt- elevated cholesterol, elevated LDL, elevated CRP, TSH WNL, HgAb1c 6.1%, av glucose elevated, fasting insulin elevated, ALT elevated. Sleep study showed mild sleep apnea. \par \par 3/14/22: Maintained weight, tolerating metformin 2,000 mg daily. Frustrated that she isn't losing weight. Increased fruit intake. Food recall: B- yogurt with banana, L- lean cuisine, dinner- lean protein with vegetables. Joined [employer]- went once with her children so far- did 30-40 mins of cardio exercise on the indoor track and treadmill. Struggling with increased stress r/t financial stressor and hx of PTSD. Open to therapy to help with stress at this time. \par \par 4/18/22: Maintained wt, continues to take metformin 2,000 mg daily. Frustrated that she hasn't lost wt. Reviewed recent lab results, HgBA1c increased slightly to 6.2%, Elevated CRP, Elevated WBC, cholesterol levels improved to normal levels. Trying to focus on eating better and less inflammatory diet- more vegetables, fruits, and less sugar. Water intake- adequate, drinks crystal light as well. Continues to struggle with stress. Would like to try another medication for weight loss- does not have coverage for branded weight loss medication. \par \par 5/23/22: Lost 2 lbs, taking metformin 2,000 md daily, topiramate 25 mg QHS, and Lomaira 8 mg daily. Reports sleeping better but feels hot at nighttime, + loose stools and urgency after morning dose of Metformin 1000 mg. Continues to focus on eating better, cut out artificial sweeteners. Water intake improving. Reviewed recent lab results- WBC and CRP still elevated but better. Eating- salads with grilled chicken with ranch dressing, turkey sandwich on WW, etc. Exercising 2 x per week at the [facility]- walking track/ treadmill. \par \par 6/20/22: Lost 6 lbs, 8 lbs total. Tolerating metformin 2,000 mg daily, topiramate 25 mg QHS, and Phentermine 15 mg daily. Feels motivated now that she has seen some progress with weight loss. Making better food choices and continues to go to the [facility] to exercise as able. Saw PCP for elevated WBC & CRP. \par \par 7/25/22: Lost 4 lbs, 12 lbs total. Tolerating medication, reviewed lab result (CRP elevated but improved, HgBA1c down to 5.9%, Insulin level normal, WBC unchanged). Able to eat smaller portions and reports good appetite suppression. Sleep inadequate. \par \par 9/22/22: Lost 7 lbs, down 19 lbs total. Tolerating medication and reports decrease in appetite. Has stopped snacking and eats smaller portions. Sleep inadequate and struggling with high stress level r/t custody lundy with ex- for her daughter. Starting family therapy with her daughter next week. Eating a healthy diet overall.\par \par 11/16/22: Lost 5 lbs, down 24 lbs total. Tolerating medication regimen and wants to continue at this point. Continues to struggle with high stress level related to custody lundy over her daughter.Sleep inadequate due to stress. Continues to go to family therapy with her daughter. Denies finding therapist for herself. \par \par 11/21/22: Able to get an appointment on 12/13/22 to start therapy through the employee assistance program at [facility]. Feels better overall today with her current situation. Reports getting better sleep since starting the higher dose of topiramate, denies side effects. \par \par 12/19/22: Maintained wt, plans on starting therapy next week. Continues to take medication and feels that it helps but off track with intake of vegetables and has been eating more carbs lately. \par \par 1/25/23: Lost 4 lbs, continues to tolerate medication well. Eating healthy dietary intake. Water intake adequate. Exercise limited by time- cannot fit it into her schedule, but is physically active throughout the day.\par \par 3/22/23: Maintained weight, bought a bike and getting it assembled. Dietary intake- hasn't been able to make home cooked meals due to busier schedule overall. Continues to struggle with stress level r/t daughter moving out of the house but has been doing therapy sessions on Wednesdays which she feels is helping. \par \par 4/12/23: Lost 3 lbs. Eating more hearty foods throughout the winter. Plans on changing to salads over the summer months and starting to use bike for exercise. Concerned that she was hitting a platue with weight loss, but didn't’t realize that she lost 3 lbs this month. \par \par 5/10/23: Maintained weight. Eating more salad, water intake adequate. Starting seeing someone which makes her feel happier overall. Continues to tolerate medication regimen at this time. Taking metfomrin 2 grams daily, phentermine 30 mg daily, and topiramate 75 mg QHS.  Additional Anesthesia Volume In Cc (Will Not Render If 0): 0

## 2024-10-28 ENCOUNTER — APPOINTMENT (OUTPATIENT)
Dept: BARIATRICS/WEIGHT MGMT | Facility: CLINIC | Age: 49
End: 2024-10-28
Payer: COMMERCIAL

## 2024-10-28 VITALS — BODY MASS INDEX: 34.15 KG/M2 | HEIGHT: 64 IN | WEIGHT: 200 LBS

## 2024-10-28 DIAGNOSIS — E66.811 OBESITY, CLASS 1: ICD-10-CM

## 2024-10-28 DIAGNOSIS — R73.09 OTHER ABNORMAL GLUCOSE: ICD-10-CM

## 2024-10-28 DIAGNOSIS — R51.9 HEADACHE, UNSPECIFIED: ICD-10-CM

## 2024-10-28 PROCEDURE — G2211 COMPLEX E/M VISIT ADD ON: CPT

## 2024-10-28 PROCEDURE — 99214 OFFICE O/P EST MOD 30 MIN: CPT

## 2024-10-31 ENCOUNTER — APPOINTMENT (OUTPATIENT)
Dept: BARIATRICS/WEIGHT MGMT | Facility: CLINIC | Age: 49
End: 2024-10-31

## 2025-01-29 ENCOUNTER — APPOINTMENT (OUTPATIENT)
Dept: BARIATRICS/WEIGHT MGMT | Facility: CLINIC | Age: 50
End: 2025-01-29
Payer: COMMERCIAL

## 2025-01-29 VITALS — BODY MASS INDEX: 36.22 KG/M2 | WEIGHT: 211 LBS

## 2025-01-29 DIAGNOSIS — R73.03 PREDIABETES.: ICD-10-CM

## 2025-01-29 DIAGNOSIS — E66.811 OBESITY, CLASS 1: ICD-10-CM

## 2025-01-29 PROCEDURE — 99214 OFFICE O/P EST MOD 30 MIN: CPT | Mod: 95

## 2025-01-29 RX ORDER — METFORMIN ER 500 MG 500 MG/1
500 TABLET ORAL DAILY
Qty: 90 | Refills: 2 | Status: ACTIVE | COMMUNITY
Start: 2025-01-29 | End: 1900-01-01

## 2025-03-11 ENCOUNTER — TRANSCRIPTION ENCOUNTER (OUTPATIENT)
Age: 50
End: 2025-03-11

## 2025-03-13 ENCOUNTER — TRANSCRIPTION ENCOUNTER (OUTPATIENT)
Age: 50
End: 2025-03-13

## 2025-03-13 DIAGNOSIS — E66.811 OBESITY, CLASS 1: ICD-10-CM

## 2025-03-13 DIAGNOSIS — R73.03 PREDIABETES.: ICD-10-CM

## 2025-03-13 RX ORDER — SEMAGLUTIDE 0.68 MG/ML
2 INJECTION, SOLUTION SUBCUTANEOUS
Qty: 1 | Refills: 0 | Status: ACTIVE | COMMUNITY
Start: 2025-03-13 | End: 1900-01-01

## 2025-04-14 ENCOUNTER — APPOINTMENT (OUTPATIENT)
Dept: BARIATRICS/WEIGHT MGMT | Facility: CLINIC | Age: 50
End: 2025-04-14
Payer: COMMERCIAL

## 2025-04-14 VITALS — WEIGHT: 215 LBS | HEIGHT: 64 IN | BODY MASS INDEX: 36.7 KG/M2

## 2025-04-14 DIAGNOSIS — R73.03 PREDIABETES.: ICD-10-CM

## 2025-04-14 DIAGNOSIS — E66.811 OBESITY, CLASS 1: ICD-10-CM

## 2025-04-14 PROCEDURE — 99213 OFFICE O/P EST LOW 20 MIN: CPT | Mod: 95

## 2025-04-22 ENCOUNTER — RX RENEWAL (OUTPATIENT)
Age: 50
End: 2025-04-22

## 2025-04-28 ENCOUNTER — RX RENEWAL (OUTPATIENT)
Age: 50
End: 2025-04-28

## 2025-07-01 ENCOUNTER — APPOINTMENT (OUTPATIENT)
Dept: BARIATRICS/WEIGHT MGMT | Facility: CLINIC | Age: 50
End: 2025-07-01